# Patient Record
Sex: MALE | Race: WHITE
[De-identification: names, ages, dates, MRNs, and addresses within clinical notes are randomized per-mention and may not be internally consistent; named-entity substitution may affect disease eponyms.]

---

## 2017-04-18 ENCOUNTER — HOSPITAL ENCOUNTER (OUTPATIENT)
Dept: HOSPITAL 84 - D.LAB | Age: 77
Discharge: HOME | End: 2017-04-18
Attending: FAMILY MEDICINE
Payer: MEDICARE

## 2017-04-18 VITALS — BODY MASS INDEX: 30.8 KG/M2

## 2017-04-18 DIAGNOSIS — J44.9: ICD-10-CM

## 2017-04-18 DIAGNOSIS — I10: ICD-10-CM

## 2017-04-18 DIAGNOSIS — I25.10: ICD-10-CM

## 2017-04-18 DIAGNOSIS — N40.0: ICD-10-CM

## 2017-04-18 DIAGNOSIS — E78.5: ICD-10-CM

## 2017-04-18 DIAGNOSIS — Z00.00: Primary | ICD-10-CM

## 2017-04-18 LAB
ALBUMIN SERPL-MCNC: 3.2 G/DL (ref 3.4–5)
ALP SERPL-CCNC: 37 U/L (ref 46–116)
ALT SERPL-CCNC: 21 U/L (ref 10–68)
ANION GAP SERPL CALC-SCNC: 6.1 MMOL/L (ref 8–16)
BILIRUB SERPL-MCNC: 0.4 MG/DL (ref 0.2–1.3)
BUN SERPL-MCNC: 20 MG/DL (ref 7–18)
CALCIUM SERPL-MCNC: 9.4 MG/DL (ref 8.5–10.1)
CHLORIDE SERPL-SCNC: 101 MMOL/L (ref 98–107)
CHOLEST/HDLC SERPL: 11.6 RATIO (ref 2.3–4.9)
CO2 SERPL-SCNC: 36.5 MMOL/L (ref 21–32)
CREAT SERPL-MCNC: 1.2 MG/DL (ref 0.6–1.3)
ERYTHROCYTE [DISTWIDTH] IN BLOOD BY AUTOMATED COUNT: 12.6 % (ref 11.5–14.5)
GLOBULIN SER-MCNC: 4.3 G/L
GLUCOSE SERPL-MCNC: 117 MG/DL (ref 74–106)
HCT VFR BLD CALC: 43.5 % (ref 42–54)
HDLC SERPL-MCNC: 22 MG/DL (ref 32–96)
HGB BLD-MCNC: 14.3 G/DL (ref 13.5–17.5)
LDL-HDL RATIO: 9 RATIO (ref 1.5–3.5)
LDLC SERPL-MCNC: 198 MG/DL (ref 0–100)
LYMPHOCYTES NFR BLD AUTO: 26.9 % (ref 15–50)
MCH RBC QN AUTO: 32 PG (ref 26–34)
MCHC RBC AUTO-ENTMCNC: 32.9 G/DL (ref 31–37)
MCV RBC: 97.3 FL (ref 80–100)
NEUTROPHILS NFR BLD AUTO: 65.8 % (ref 40–80)
OSMOLALITY SERPL CALC.SUM OF ELEC: 281 MOSM/KG (ref 275–300)
PLATELET # BLD: 231 10X3/UL (ref 130–400)
PMV BLD AUTO: 9.5 FL (ref 7.4–10.4)
POTASSIUM SERPL-SCNC: 4.6 MMOL/L (ref 3.5–5.1)
PROT SERPL-MCNC: 7.5 G/DL (ref 6.4–8.2)
PSA SERPL-MCNC: 1.33 NG/ML (ref 0–4)
RBC # BLD AUTO: 4.47 10X6/UL (ref 4.2–6.1)
SODIUM SERPL-SCNC: 139 MMOL/L (ref 136–145)
TRIGL SERPL-MCNC: 183 MG/DL (ref 30–200)
WBC # BLD AUTO: 6.6 10X3/UL (ref 4.8–10.8)

## 2017-04-28 ENCOUNTER — HOSPITAL ENCOUNTER (INPATIENT)
Dept: HOSPITAL 84 - D.ER | Age: 77
LOS: 3 days | Discharge: HOME | DRG: 286 | End: 2017-05-01
Attending: FAMILY MEDICINE | Admitting: FAMILY MEDICINE
Payer: MEDICARE

## 2017-04-28 VITALS
WEIGHT: 180 LBS | BODY MASS INDEX: 29.99 KG/M2 | BODY MASS INDEX: 29.99 KG/M2 | HEIGHT: 65 IN | WEIGHT: 180 LBS | BODY MASS INDEX: 29.99 KG/M2 | HEIGHT: 65 IN

## 2017-04-28 DIAGNOSIS — N40.0: ICD-10-CM

## 2017-04-28 DIAGNOSIS — J96.22: ICD-10-CM

## 2017-04-28 DIAGNOSIS — K21.9: ICD-10-CM

## 2017-04-28 DIAGNOSIS — J20.9: ICD-10-CM

## 2017-04-28 DIAGNOSIS — G47.33: ICD-10-CM

## 2017-04-28 DIAGNOSIS — E78.5: ICD-10-CM

## 2017-04-28 DIAGNOSIS — J96.21: ICD-10-CM

## 2017-04-28 DIAGNOSIS — J44.1: ICD-10-CM

## 2017-04-28 DIAGNOSIS — I11.0: ICD-10-CM

## 2017-04-28 DIAGNOSIS — Z95.5: ICD-10-CM

## 2017-04-28 DIAGNOSIS — I50.20: ICD-10-CM

## 2017-04-28 DIAGNOSIS — I25.10: Primary | ICD-10-CM

## 2017-04-28 DIAGNOSIS — Z86.73: ICD-10-CM

## 2017-04-28 DIAGNOSIS — N28.1: ICD-10-CM

## 2017-04-28 DIAGNOSIS — J98.11: ICD-10-CM

## 2017-04-28 DIAGNOSIS — I08.1: ICD-10-CM

## 2017-04-28 DIAGNOSIS — J44.0: ICD-10-CM

## 2017-04-28 NOTE — HEMODYNAMI
PATIENT:ANGELICA MCGREGOR                                     MEDICAL RECORD: [id number]
: 42                                            LOCATION:[facility]     D.2136
[id number]                                       ADMISSION DATE: 17
 
 
 Generatedon:201714:02
Patient name: ANGELICA MCGREGOR Patient #: [id number] Visit #: [id number] SSN: : 3
/ Date
of study: 2017
Page: Of
Hemodynamic Procedure Report
****************************
Patient Data
Patient Demographics
Procedure consent was obtained
First Name: ANGELICA             Gender: Male
Last Name: MECHE           : 1940
Middle Initial: W           Age: 77 year(s)
Patient #: [id number]       Race: 
Visit #: [id number]
Accession #:
[id number]
Additional ID: [id number]
Contact details
Address: [address] [place] [address]   Phone: [phone]
State: [place]
City: [facility]
Zip code: [address]
Past Medical History
History of disease
Date         Diagnosis          Comments
CAD
Allergies
Allergen        Reaction        Date         Comments
Reported
Other allergy                   2016    Crestor
Other allergy                   2017     Crestor
Admission
Admission Data
Admission Date: 2017   Admission Time: 3:54
Room #: D.2136
Height (in.): 71            BSA: 0.76 (m2)
Height (cm.): 180.34        BMI: 2.51 (kg/m2)
Weight (lbs.): 18
Weight (kg.): 8.16
Lab Results
Lab Result Date: 2017   Lab Result Time: 0:00
Biochemistry
Name         Units    Result                Min      Max
BUN          mg/dl    22       --(----)-*   7        18
Creatinine   mg/dl    1.1      --(--*-)--   0.6      1.3
CBC
Name         Units    Result                Min      Max
Hemoglobin   g/dl     13.3     -*(----)--   13.5     17.5
Procedure
Procedure Types
Cath Procedure
Diagnostic Procedure
Former[facility] w/Coronaries
Miscellaneous Procedures
Moderate Sedation up to 15 minutes
Procedure Description
Procedure Date
Procedure Date: 2017
Procedure Start Time: 13:49
Procedure End Time: 13:59
Procedure Staff
Name                            Function
Brennen Mejia MD                   Performing Physician
Jason Álvarez RN                Nurse
Karly Monson RT             Scrub
Aristeo Waddell RT                  Monitor
Teodoro Marvin RT                  Monitor
Procedure Data
Cath Procedure
Fluoroscopy
Diagnostic fluoroscopy      Total fluoroscopy Time: 1.9
time: 1.9 min               min
Diagnostic fluoroscopy      Total fluoroscopy dose:
dose: 242.05 mGy            242.05 mGy
Contrast Material
Contrast Material Type                       Amount (ml)
Isovue 300                                   76
Entry Location
Entry     Primary  Successful  Side  Size  Upsize Upsize Entry    Closure Succes
sful  Closure
Location                             (Fr)  1 (Fr) 2 (Fr) Remarks  Device        
      Remarks
Femoral                        Right 5 Fr                         Exoseal
artery
Estimated blood loss: 5 ml
Diagnostic catheters
Device Type               Used For           End Catheter
Placement
Medtronic Dexterity 5Fr   Procedure
JL 4.0 catheter (NO
CHARGE)
Medtronic Dexterity 5Fr   Procedure
3DRC catheter (NO CHARGE)
Medtronic Dexterity 5Fr   Procedure
Pigtail catheter (NO
CHARGE)
Procedure Complications
No complications
Procedure Medications
Medication           Administration Route Dosage
Oxygen               NC                   2 l/min
Heparin Flush Bag    added to field       2 bags
(1000units/500ml NS)
0.9% NaCl            I.V.                 100 ml/hr
Fentanyl             I.V.                 50 mcg
Versed               I.V.                 1 mg
Fentanyl             I.V.                 50 mcg
Versed               I.V.                 1 mg
Fentanyl             I.V.                 50 mcg
Fentanyl             I.V.                 50 mcg
Hemodynamics
Rest
 
BSA: 0.76 (m2) HGB: 13.3 (g/dl) O2 Consumption: Estimated: 91.85 (ml/min) O2 Con
sumption
indexed: Estimated:120.86 (ml/min/m) Heart Rate: 86 (bpm)
Pressure Samples
Time     Site     Value (mmHg) Purpose      Heart      Use
Rate(bpm)
13:56    LV       144/7,16     Snapshot     83
13:57    AO       150/56(91)   Pullback     81
13:57    LV       145/8,16     Pullback     81
Gradients
Valve  Time  Site 1   Site 2     Mean    SEP/DFP    Peak To Heart  Use
(mmHg)  (sec/min)  Peak    Rate
(mmHg)  (bpm)
Aortic 13:57 LV       AO         0       9          0       81
145/8,16 150/56(91)
Calculations
Valve    P-P      Mean      Valve     Index  Valve    Source
Name              Gradient  Area             Flow
(cm2)
Aortic   0        0
0        0
Snapshots
Pre Cath      Intra         NCS           Post Cath
Vital Signs
Time     Heart  Resp   SPO2 NIBP (mmHg) Rhythm  Pain    Sedation
Rate   (ipm)  (%)                      Status  Level
(bpm)
13:32:19 100    18     96   174/85(136) NSR     0 (11)  10(A)
, No
pain
13:36:37 81     17     96   148/76(111) NSR     0 (11)  10(A)
, No
pain
13:40:57 81     18     95   138/68(107) NSR     0 (11)  10(A)
, No
pain
13:45:20 80     17     95   140/68(100) NSR     0 (11)  10(A)
, No
pain
13:49:40 78     17     91   136/71(107) NSR     0 (11)  9(A)
, No
pain
13:53:58 75     17     91   139/72(105) NSR     0 (11)  9(A)
, No
pain
13:58:18 82     18     92   136/71(106) NSR     0 (11)  9(A)
, No
pain
14:01:37 83     16     92   134/73(98)  NSR     0 (11)  9(A)
, No
pain
Medications
Time     Medication       Route  Dose  Verified Delivered Reason    Notes  Effec
tiveness
by       by
13:28:45 Oxygen           NC     2     Jason   Jason    Per
l/min Henri Álvarez RN physician
RN
13:28:56 Heparin Flush    added  2     Jason   Jason    used for
Bag              to     bags  Henri Álvarez RN procedure
 
(1000units/500ml field        RN
NS)
13:29:10 0.9% NaCl        I.V.   100   Jason   Jason    Per
ml/hr Herni Álvarez RN physician
RN
13:48:14 Fentanyl         I.V.   50    Jason   Jason    for
narciso Álvarez RN sedation
RN
13:48:21 Versed           I.V.   1 mg  Jason   Jason    for
Henri Álvarez RN sedation
RN
13:50:21 Fentanyl         I.V.   50    Jason   Jason    for
narciso Álvarez RN sedation
RN
13:50:27 Versed           I.V.   1 mg  Jason   Jason    for
Henri Álvarez RN sedation
RN
13:52:47 Fentanyl         I.V.   50    Jason   Jason    for
narciso Álvarez RN sedation
RN
13:56:38 Fentanyl         I.V.   50    Jason   Jason    for
narciso Álvarez RN sedation
RN
Procedure Log
Time     Note
13:06:20 Jason Álvarez RN sent for patient. Start room use.
13:15:21 Time tracking: Regular hours
13:15:25 Plan of Care:Hemodynamics will remain stable., Cardiac
rhythm will remain stable., Comfort level will be
maintained., Respiratory function will remain
adequate., Patient/ family verbilizes understanding of
procedure., Procedure tolerated without complication.,
Recovers from procedure without complications..
13:24:06 Patient received from PCU to CCL 3 Alert and oriented.
Tansferred to table in Supine position.
13:24:07 Warm blankets applied, and abram hugger turned on for
patient comfort.
13:24:07 Correct patient and procedure confirmed by team.
13:24:09 Signed procedure consent form obtained from patient.
13:24:10 ECG and BP/O2 sat monitors applied to patient.
13:24:10 Full Disclosure recording started
13:28:45 Oxygen 2 l/min NC was administered by Jason Álvarez
RN; Per physician;
13:28:56 Heparin Flush Bag (1000units/500ml NS) 2 bags added to
field was administered by Jason Álvarez RN; used for
procedure;
13:29:10 0.9% NaCl 100 ml/hr I.V. was administered by Jason Álvarez RN; Per physician;
13:30:56 Vital chart was started
13:33:26 Baseline sample Acquired.
13:33:31 Rhythm: sinus rhythm
13:34:10 Diagnostic Cath Status : Elective
13:36:02 H&P Date Dictated: 2017 Within 30 days and on
chart..
13:36:04 Pre-procedure instructions explained to patient.
13:36:05 Pre-op teaching completed and patient verbalized
understanding.
13:36:22 Family in waiting room.
13:36:24 Patient NPO since Breakfast.
13:36:46 Patient allergic to Other allergy Crestor
 
13:36:49 Is the patient allergic to Iodine/contrast media? No.
13:36:55 Is patient on blood thinner?No
13:36:59 Patient diabetic? No.
13:37:01 ----Pre-sedation anethsthesia assessment.----
13:37:03 Previous problem with sedation/anesthesia? No ?
13:37:05 Snore? Yes
13:37:06 Sleep apnea? Yes
13:37:08 Deviated septum? No
13:37:09 Opens mouth fully? Yes
13:37:10 Sticks out tongue? Yes
13:37:16 Airway obstruction? Yes COPD
13:37:19 Dentures? No ?
13:37:24 Pre procedure: right dorsailis pedis pulse 2+ Normal;
easily identifiable; not easily obliterated
13:37:29 Patient pain scale 0/10 ?.
13:37:36 IV patent on arrival in left forearm with 0.9% NaCl at
[facility].
13:38:13 Lab Result : BUN 22 mg/dl
13:38:13 Lab Result : Creatinine 1.1 mg/dl
13:38:13 Lab Result : Hemoglobin 13.3 g/dl
13:38:17 Lab results completed and on chart.
13:38:23 Right groin area was prepped with chlora-prep and
draped in sterile fashion
13:38:54 NO PREP OF RADIAL, LAST CASE WAS A 7 [place] SYSTEM
13:38:57 Alarms reviewed by R. N.
13:38:58 Sharps counted by scrub and verified by R.N.
13:39:19 Patient Height : 180.34 cm
13:39:40 Patient Weight : 8.16 kg
13:39:52 Use device set Femoral Dx
13:39:54 Acist Syringe opened to sterile field.
13:39:54 Bag Decanter opened to sterile field.
13:39:55 Medline Cath Pack opened to sterile field.
13:39:56 Terumo 5Fr [place] Sheath opened to sterile field.
13:39:56 [place]is 260cm J .035 wire opened to sterile field.
13:39:58 Acist Hand Control opened to sterile field.
13:39:59 Acist Manifold opened to sterile field.
13:40:02 Tegaderm 4 x 4 opened to sterile field.
13:46:31 Zero performed for pressure channel P1
13:46:38 Physician arrived
13:46:38 --------ALL STOP TIME OUT------
13:46:39 Final Timeout: patient, procedure, and site verified
with staff and physician. All members of the team are
in agreement.
13:46:41 Right groin site verified by team.
13:46:45 Physical assessment completed. ASA score P 2 - A
patient with mild systemic disease as per Brennen Mejia MD.
13:46:50 Sedation plan: IV Moderate Sedation Versed, Fentanyl
13:47:51 Zero performed for pressure channel P1
13:48:14 Fentanyl 50 mcg I.V. was administered by Jason Álvarez
RN; for sedation;
13:48:21 Versed 1 mg I.V. was administered by Jason Álvarez RN;
for sedation;
13:49:32 Procedure started.
13:49:35 Local anesthetic to right femoral artery with
Lidocaine 2% by Brennen Mejia MD.**INITIAL ACCESS ONLY**
13:49:46 A 5 Fr sheath was inserted into the Right Femoral
artery
13:50:21 Fentanyl 50 mcg I.V. was administered by Jason Álvarez
RN; for sedation;
 
13:50:27 Versed 1 mg I.V. was administered by Jason Álvarez RN;
for sedation;
13:51:22 A Medtronic Dexterity 5Fr JL 4.0 catheter (NO CHARGE)
was advanced over the wire and used for Procedure.
13:52:47 Fentanyl 50 mcg I.V. was administered by Jason Álvarez
RN; for sedation;
13:53:03 LCA angiography performed.
13:53:06 Catheter exchanged over wire.
13:53:11 A Medtronic Dexterity 5Fr 3DRC catheter (NO CHARGE)
was advanced over the wire and used for Procedure.
13:54:38 RCA angiography performed.
13:55:05 Catheter exchanged over wire.
13:55:11 A Medtronic Dexterity 5Fr Pigtail catheter (NO CHARGE)
was advanced over the wire and used for Procedure.
13:55:16 Cordis 5Fr Exoseal opened to sterile field.
13:56:26 LV gram done using SORTO
13:56:31 Injector settings: Ml/sec: 10, Volume: 20,
13:56:38 Fentanyl 50 mcg I.V. was administered by Jason Álvarez
RN; for sedation;
13:56:54 EF : 60 %
13:57:16 LV hemodynamics recorded.
13:57:22 Catheter removed.
13:57:36 Sheath removed intact; hemostasis achieved with
Exoseal to the Right Femoral artery.
13:57:39 Procedure ended.(Physican Out)
13:57:50 Fluoroscopy time 01.90 minutes.
13:58:00 Fluoroscopy dose: 242.05 mGy
13:58:00 Flurop Dose total: 242.05
13:58:04 Contrast amount:Isovue 300 76ml.
13:58:05 Sharps counted by scrub and verified by R.N.
13:58:07 Insertion/operative site no bleeding no hematoma.
13:58:09 Post-op/insertion site Right Femoral artery dressed
using a 4 x 4 and Tegaderm.
13:58:13 Post right femoral artery:stable, soft, clean and dry
13:58:14 Post Procedure Pulses reassessed and unchanged
13:58:16 Post-procedure physical assessment completed. ASA
score P 2 - A patient with mild systemic disease as
per Brennen Mejia MD.
13:58:18 Post procedure rhythm: unchanged.
13:58:21 Estimated blood loss: 5 ml
13:58:22 Post procedure instruction explained to
patient.Patient verbalizes understanding.
13:58:22 Patient needs reinforcement of post procedure
teaching.
13:58:30 Procedure type changed to Cath procedure, Diagnostic
procedure, LHC, LHC w/Coronaries, Miscellaneous
Procedures, Moderate Sedation up to 15 minutes
13:58:52 Procedure and supply charges have been captured,
reviewed, submitted and are correct.
13:58:54 Procedure Complication : No complications
13:59:01 Vital chart was stopped
13:59:01 See physician's report for complete and final results.
13:59:02 Report given to Pre/Post Procedure Room.
13:59:05 Patient transfered to Pre/Post Procedure Room with
Stretcher.
13:59:06 Procedure ended.
13:59:06 Full Disclosure recording stopped
13:59:13 End room use (Document Last)
Device Usage
Item Name Manufacture  Quantity  Catalog     Hospital Part    Current Minimal Lo
 
t# /
Number      Charge   Number  Stock   Stock   Serial#
Code
Acist     Acist        1         [id number]       716160   940024  685368  20
Syringe   Medical
Systems Inc
Bag       Microtek     1         S       465497   78042   063703  5
[employer]
Medline   Cardinal     1         [id number]   177933   69148   183603  5
Erydel    TerQUALIA (formerly known as LocalResponse)       1         [id number]      254228   250016  411222  40
5Fr
[place]
Sheath
St Alexis   St Alexis      1         398352      333514   765514  296761  30
260cm J
.035 wire
Acist     Acist        1         62295       759224   643931  708013  5
Hand      [employer]
Control   Systems Inc
Acist     Acist        1         51673       973044   920770  176293  5
Manifold  Medical
Systems Inc
Tegaderm             1         1626W       540238   502999  786475  5
4 x 4
Medtronic Medtronic    1         [id number]    300585           944577  5
Dexterity
5Fr JL
4.0
catheter
(NO
CHARGE)
Medtronic Medtronic    1         [id number]    452474           624234  5
Dexterity
5Fr 3DRC
catheter
(NO
CHARGE)
Medtronic Medtronic    1         [id number]  135278           124991  5
Dexterity
5Fr
Pigtail
catheter
(NO
CHARGE)
Cordis    Cardinal     1                951810   593922  498315  10
5Fr       Health
Exoseal
Signature Audit [place]
Stage           Time        Signature      Unsigned
Intra-Procedure 2017    Teodoro Marvin
2:02:20 PM  RT(R)
Signatures
Monitor : Aristeo Waddell RT Signature :
______________________________
Date : ______________ Time :
______________
Monitor : Teodoro Marvin RT Signature :
______________________________
 
Date : ______________ Time :
______________
 
 
 
 
 
 
 
 
 
 
 
 
 
 
 
 
 
 
 
 
 
 
 
 
 
 
 
 
 
 
 
 
 
 
 
 
 
 
 
 
 
 
 
 
 
 
 
 
 
 
 
 
 
[place] [address]                           
[place] [address]

## 2017-04-29 VITALS — DIASTOLIC BLOOD PRESSURE: 75 MMHG | SYSTOLIC BLOOD PRESSURE: 147 MMHG

## 2017-04-29 VITALS — SYSTOLIC BLOOD PRESSURE: 113 MMHG | DIASTOLIC BLOOD PRESSURE: 56 MMHG

## 2017-04-29 VITALS — DIASTOLIC BLOOD PRESSURE: 84 MMHG | SYSTOLIC BLOOD PRESSURE: 103 MMHG

## 2017-04-29 VITALS — SYSTOLIC BLOOD PRESSURE: 157 MMHG | DIASTOLIC BLOOD PRESSURE: 98 MMHG

## 2017-04-29 LAB
ALBUMIN SERPL-MCNC: 3.4 G/DL (ref 3.4–5)
ALP SERPL-CCNC: 37 U/L (ref 46–116)
ALT SERPL-CCNC: 30 U/L (ref 10–68)
ANION GAP SERPL CALC-SCNC: 9.5 MMOL/L (ref 8–16)
BASOPHILS NFR BLD AUTO: 0.4 % (ref 0–2)
BILIRUB SERPL-MCNC: 0.4 MG/DL (ref 0.2–1.3)
BUN SERPL-MCNC: 23 MG/DL (ref 7–18)
CALCIUM SERPL-MCNC: 8.7 MG/DL (ref 8.5–10.1)
CHLORIDE SERPL-SCNC: 99 MMOL/L (ref 98–107)
CK MB SERPL-MCNC: 2.2 U/L (ref 0–3.6)
CK SERPL-CCNC: 75 UL (ref 21–232)
CK SERPL-CCNC: 75 UL (ref 21–232)
CO2 SERPL-SCNC: 35.3 MMOL/L (ref 21–32)
CREAT SERPL-MCNC: 1.3 MG/DL (ref 0.6–1.3)
EOSINOPHIL NFR BLD: 5.2 % (ref 0–7)
ERYTHROCYTE [DISTWIDTH] IN BLOOD BY AUTOMATED COUNT: 12.7 % (ref 11.5–14.5)
GLOBULIN SER-MCNC: 3.5 G/L
GLUCOSE SERPL-MCNC: 184 MG/DL (ref 74–106)
HCT VFR BLD CALC: 54.5 % (ref 42–54)
HGB BLD-MCNC: 17.9 G/DL (ref 13.5–17.5)
IMM GRANULOCYTES NFR BLD: 0.2 % (ref 0–5)
LYMPHOCYTES NFR BLD AUTO: 22.6 % (ref 15–50)
MCH RBC QN AUTO: 32.3 PG (ref 26–34)
MCHC RBC AUTO-ENTMCNC: 32.8 G/DL (ref 31–37)
MCV RBC: 98.2 FL (ref 80–100)
MONOCYTES NFR BLD: 4.9 % (ref 2–11)
NEUTROPHILS NFR BLD AUTO: 66.7 % (ref 40–80)
NT-PROBNP SERPL-MCNC: 531 PG/ML (ref 0–450)
OSMOLALITY SERPL CALC.SUM OF ELEC: 287 MOSM/KG (ref 275–300)
PLATELET # BLD: 152 10X3/UL (ref 130–400)
PMV BLD AUTO: 11.2 FL (ref 7.4–10.4)
POTASSIUM SERPL-SCNC: 3.8 MMOL/L (ref 3.5–5.1)
PROT SERPL-MCNC: 6.9 G/DL (ref 6.4–8.2)
RBC # BLD AUTO: 5.55 10X6/UL (ref 4.2–6.1)
SODIUM SERPL-SCNC: 140 MMOL/L (ref 136–145)
TROPONIN I SERPL-MCNC: 0.04 NG/ML (ref 0–0.06)
TROPONIN I SERPL-MCNC: 0.14 NG/ML (ref 0–0.06)
WBC # BLD AUTO: 5.4 10X3/UL (ref 4.8–10.8)

## 2017-04-29 NOTE — NUR
INFORMED MIKAL IN TELEMETRY THAT PT HAS ORDERS TO BE PUT ON HEART MONITOR.
MIKAL STATED THAT THEY ARE ONLY PUTTING PTS ON THE MONITOR IF THEY HAVE CHEST
PAIN, ELEVATED TROPONIN, CHF, AND ANGINA. WILL CONTINUE TO MONITOR.

## 2017-04-29 NOTE — NUR
ADMISSION ASSESSMENT DONE. PT IS LAYING IN BED ON BACK WITH EYES CLOSED
RESTING. BI-PAP IS ON AND WIFE IS AT BEDSIDE. NO NEED AT CURRENT TIME. WILL
CONTINUE TO MONITOR.

## 2017-04-29 NOTE — NUR
REC'D PT FROM ER VIA BED. PT A/O X3. WEARING BI-PAP AT 40%. SPOUSE AT BEDSIDE.
18G IV TO RT AC, IS SL. ABRASION TO LEFT FOREHEAD D/T FALL. PT AND SPOUSE
ORIENTED TO ROOM. CALL LIGHT WITH IN REACH. PT ASKING ABOUT FOOD. INFORMED PT
AND SPOUSE THAT WE ARE AWAITING ORDERS FROM DR. LARA. PT APPEARS
COMFORTABLE. NO DISTRESS NOTED. WHEN ASKED IF PT WAS FEELING BETTER SHE SHOOK
HIS HEAD YES.

## 2017-04-29 NOTE — NUR
0656- PT TO FLOOR VIA STRETCHER ACCOMPANIED BY ER STAFF. RECEIVED REPORT FROM
LEOBARDO (NIGHT SHIFT NURSE). PT IS ALERT AND ORIENTED. ON BI-PAP. IV SEEN TO
RIGHT AC THAT IS CURRENTLY SALINE LOCKED. WIFE IS AT BEDSIDE. LEOBARDO (NIGHT
SHIFT NURSE) IS DOING QUICKSTART AND ADMISSION HISTORY, WILL DO ADMISSION
ASSESSMENT AND CONTINUE TO MONITOR.

## 2017-04-29 NOTE — NUR
HS MEDS GIVEN WITH FRESH ICE WATER, INFORMED H.S. THAT I HAD SENT OUT A PAGE
FOR DR MCGOVERN BUT DIDNT RECIEVE A RETURN CALL.

## 2017-04-29 NOTE — NUR
UPON WALKING INTO PTS ROOM, NOTICED PT IS OFF HIS BI-PAP AND HIS NC IS NOT ON.
PT IS SITTING UP IN SIDE OF THE BED EATING LUNCH. I CHECKED PTS 02 SAT WHICH
WAS 74%. I ASKED PT IF HE FELT OKAY AND HE REPLIED " YES". I TURNED PTS O2 VIA
NC UP TO 4L AND RECHECKED PTS O2 SAT WHICH IS 90% NOW. INSTRUCTED PT LEAVE 02
ON. WILL CONTINUE TO MONITOR.

## 2017-04-30 VITALS — SYSTOLIC BLOOD PRESSURE: 161 MMHG | DIASTOLIC BLOOD PRESSURE: 65 MMHG

## 2017-04-30 VITALS — SYSTOLIC BLOOD PRESSURE: 164 MMHG | DIASTOLIC BLOOD PRESSURE: 69 MMHG

## 2017-04-30 VITALS — DIASTOLIC BLOOD PRESSURE: 67 MMHG | SYSTOLIC BLOOD PRESSURE: 124 MMHG

## 2017-04-30 VITALS — SYSTOLIC BLOOD PRESSURE: 133 MMHG | DIASTOLIC BLOOD PRESSURE: 57 MMHG

## 2017-04-30 VITALS — DIASTOLIC BLOOD PRESSURE: 65 MMHG | SYSTOLIC BLOOD PRESSURE: 153 MMHG

## 2017-04-30 VITALS — SYSTOLIC BLOOD PRESSURE: 159 MMHG | DIASTOLIC BLOOD PRESSURE: 78 MMHG

## 2017-04-30 VITALS — SYSTOLIC BLOOD PRESSURE: 139 MMHG | DIASTOLIC BLOOD PRESSURE: 64 MMHG

## 2017-04-30 LAB
ANION GAP SERPL CALC-SCNC: 9.3 MMOL/L (ref 8–16)
BASOPHILS NFR BLD AUTO: 0 % (ref 0–2)
BUN SERPL-MCNC: 22 MG/DL (ref 7–18)
CALCIUM SERPL-MCNC: 9.1 MG/DL (ref 8.5–10.1)
CHLORIDE SERPL-SCNC: 99 MMOL/L (ref 98–107)
CO2 SERPL-SCNC: 34 MMOL/L (ref 21–32)
CREAT SERPL-MCNC: 1.1 MG/DL (ref 0.6–1.3)
EOSINOPHIL NFR BLD: 0 % (ref 0–7)
ERYTHROCYTE [DISTWIDTH] IN BLOOD BY AUTOMATED COUNT: 12.5 % (ref 11.5–14.5)
GLUCOSE SERPL-MCNC: 183 MG/DL (ref 74–106)
HCT VFR BLD CALC: 40.6 % (ref 42–54)
HGB BLD-MCNC: 13.3 G/DL (ref 13.5–17.5)
IMM GRANULOCYTES NFR BLD: 0.2 % (ref 0–5)
LYMPHOCYTES NFR BLD AUTO: 7.2 % (ref 15–50)
MAGNESIUM SERPL-MCNC: 1.7 MG/DL (ref 1.8–2.4)
MCH RBC QN AUTO: 32 PG (ref 26–34)
MCHC RBC AUTO-ENTMCNC: 32.8 G/DL (ref 31–37)
MCV RBC: 97.8 FL (ref 80–100)
MONOCYTES NFR BLD: 1.6 % (ref 2–11)
NEUTROPHILS NFR BLD AUTO: 91 % (ref 40–80)
OSMOLALITY SERPL CALC.SUM OF ELEC: 283 MOSM/KG (ref 275–300)
PHOSPHATE SERPL-MCNC: 3 MG/DL (ref 2.5–4.9)
PLATELET # BLD: 205 10X3/UL (ref 130–400)
PMV BLD AUTO: 10.6 FL (ref 7.4–10.4)
POTASSIUM SERPL-SCNC: 4.3 MMOL/L (ref 3.5–5.1)
RBC # BLD AUTO: 4.15 10X6/UL (ref 4.2–6.1)
SODIUM SERPL-SCNC: 138 MMOL/L (ref 136–145)
TROPONIN I SERPL-MCNC: 0.16 NG/ML (ref 0–0.06)
WBC # BLD AUTO: 16.6 10X3/UL (ref 4.8–10.8)

## 2017-04-30 PROCEDURE — 5A09457 ASSISTANCE WITH RESPIRATORY VENTILATION, 24-96 CONSECUTIVE HOURS, CONTINUOUS POSITIVE AIRWAY PRESSURE: ICD-10-PCS | Performed by: FAMILY MEDICINE

## 2017-04-30 NOTE — NUR
RECEIVED REPORT FROM NIGHT SHIFT NURSE, NICHOLAS ARAIZA. PT IN BED USING BIPAP AT THIS
TIME. PT DENIE ANY NEEDS AT THIS TIME. CALL LIGHT IN REACH, NAD NOTED, WILL
CONTINUE TO MONITOR.

## 2017-04-30 NOTE — NUR
ADMINISTERED MORNING MEDICATIONS. PT IN BED, FAMILY AT BEDSIDE. PT DENIES ANY
NEEDS AT THIS TIME. CALL LIGHT IN REACH, NAD NOTED, WILL CONTINUE TO MONITOR.

## 2017-04-30 NOTE — NUR
INITIAL ROUNDS MADE. PT SITTING UP IN BED WITH FAMILY IN ROOM. PT ON BIPAP AT
THIS TIME. NO NEEDS OR C/O VOICED AT THIS TIME. WILL CONT TO MONITOR.

## 2017-05-01 VITALS — DIASTOLIC BLOOD PRESSURE: 75 MMHG | SYSTOLIC BLOOD PRESSURE: 151 MMHG

## 2017-05-01 VITALS — DIASTOLIC BLOOD PRESSURE: 73 MMHG | SYSTOLIC BLOOD PRESSURE: 163 MMHG

## 2017-05-01 VITALS — SYSTOLIC BLOOD PRESSURE: 164 MMHG | DIASTOLIC BLOOD PRESSURE: 64 MMHG

## 2017-05-01 VITALS — DIASTOLIC BLOOD PRESSURE: 64 MMHG | SYSTOLIC BLOOD PRESSURE: 140 MMHG

## 2017-05-01 LAB
ANION GAP SERPL CALC-SCNC: 6.5 MMOL/L (ref 8–16)
BASOPHILS NFR BLD AUTO: 0 % (ref 0–2)
BUN SERPL-MCNC: 31 MG/DL (ref 7–18)
CALCIUM SERPL-MCNC: 8.7 MG/DL (ref 8.5–10.1)
CHLORIDE SERPL-SCNC: 100 MMOL/L (ref 98–107)
CO2 SERPL-SCNC: 37.5 MMOL/L (ref 21–32)
CREAT SERPL-MCNC: 1.2 MG/DL (ref 0.6–1.3)
EOSINOPHIL NFR BLD: 0 % (ref 0–7)
ERYTHROCYTE [DISTWIDTH] IN BLOOD BY AUTOMATED COUNT: 12.8 % (ref 11.5–14.5)
GLUCOSE SERPL-MCNC: 223 MG/DL (ref 74–106)
HCT VFR BLD CALC: 41 % (ref 42–54)
HGB BLD-MCNC: 13.4 G/DL (ref 13.5–17.5)
IMM GRANULOCYTES NFR BLD: 0.3 % (ref 0–5)
LYMPHOCYTES NFR BLD AUTO: 5.7 % (ref 15–50)
MCH RBC QN AUTO: 32.1 PG (ref 26–34)
MCHC RBC AUTO-ENTMCNC: 32.7 G/DL (ref 31–37)
MCV RBC: 98.1 FL (ref 80–100)
MONOCYTES NFR BLD: 3.8 % (ref 2–11)
NEUTROPHILS NFR BLD AUTO: 90.2 % (ref 40–80)
OSMOLALITY SERPL CALC.SUM OF ELEC: 292 MOSM/KG (ref 275–300)
PLATELET # BLD: 200 10X3/UL (ref 130–400)
PMV BLD AUTO: 10.7 FL (ref 7.4–10.4)
POTASSIUM SERPL-SCNC: 4 MMOL/L (ref 3.5–5.1)
RBC # BLD AUTO: 4.18 10X6/UL (ref 4.2–6.1)
SODIUM SERPL-SCNC: 140 MMOL/L (ref 136–145)
WBC # BLD AUTO: 14.6 10X3/UL (ref 4.8–10.8)

## 2017-05-01 PROCEDURE — 4A023N7 MEASUREMENT OF CARDIAC SAMPLING AND PRESSURE, LEFT HEART, PERCUTANEOUS APPROACH: ICD-10-PCS | Performed by: INTERNAL MEDICINE

## 2017-05-01 PROCEDURE — B2151ZZ FLUOROSCOPY OF LEFT HEART USING LOW OSMOLAR CONTRAST: ICD-10-PCS | Performed by: INTERNAL MEDICINE

## 2017-05-01 PROCEDURE — B2111ZZ FLUOROSCOPY OF MULTIPLE CORONARY ARTERIES USING LOW OSMOLAR CONTRAST: ICD-10-PCS | Performed by: INTERNAL MEDICINE

## 2017-05-01 NOTE — NUR
Patient Name: ANGELICA MCGREGOR Admission Status: ER
Accout number: [id number] Admission Date: 2017
: 1940 Admission Diagnosis:
Attending: JOANNE Current LOS: 2
 
Anticipated DC Date: 2017
Planned Disposition: Home
Primary Insurance: MEDICARE A & B
 
 
Discharge Planning Comments:
* Is the patient Alert and Oriented? Yes 0
* How many steps to enter\exit or inside your home? NONE 0
* PCP DR. ASH 0
* Pharmacy [facility]
OR [facility] 0
* Preadmission Environment Home with Family 0
* ADLs Independent 0
* Equipment CPAP
Nebulizer
Oxygen 0
* Other Equipment HOME AND PORTABLE OXYGEN
[facility] - MEDICAL EQUIPMENT PROVIDER 0
* List name and contact numbers for known caregivers / representatives who
currently or will assist patient after discharge: DEMETRIUS MCGREGOR, SPOUSE,
[phone] 0
* Community resources currently utilized None 0
* Please name any agencies selected above. NONE 0
* Additional services required to return to the preadmission environment? No 0
* Can the patient safely return to the preadmission environment? Yes 0
* Has this patient been hospitalized within the prior 30 days at any hospital?
No 0
 
CM RECEIVED ORDER FOR HOSPITAL BED AND NIPPV ARRANGEMENT. CM MET WITH PT AND
SPOUSE IN ROOM TO DISCUSS DISCHARGE PLANNING AND NEEDS. PT REPORTS LIVING AT
HOME INDEPENDENTLY WITH SPOUSE. PT HAS CPAP, NEBULILZER, HOME AND PORTABLE
OXYGEN FROM [facility] [place]. PT HAS NO OUTSIDE SERVICES ASSISTING IN THE HOME. CM
DISCUSSED AVAILABILITY OF HOME HEALTH, REHAB SERVICES AND MEDICAL EQUIPMENT.
PT DENIES DISCHARGE NEEDS OTHER THAN THE ORDERS THE DOCTORS HAVE PUT IN, PT
THINKS HE MAY GO HOME TOMORROW IF ARRANGEMENTS ARE COMPLETED. PT WOULD LIKE TO
USE [facility] [place] FOR MEDICAL EQUIPMENT. PT REPORTS HIS SPOUSE WILL PICK HIM UP FOR
DISCHARGE HOME. IMPORTANT MESSAGE FROM MEDICARE PROVIDED AND EXPLAINED.
 
CM CALLED [facility] [place], [phone], SPOKE TO RAY, NOTIFIED OF ORDER, FAXED ORDER
TO [facility] [place] [phone].
 
CM WAITING ARRANGEMENT OF HOSPITAL BED FOR HOME USE AS WELL AS NIPPV BY
[facility] [place]. CM TO CONTINUE TO FOLLOW AND ASSIST AS NEEDED.
 
: Kirk Payne

## 2017-05-01 NOTE — NUR
PT BIPAP CAN NOT BE DELIVERED FROM [facility] UNTIL TOMORROW. PT SAYS OK WITH HIM
TO JUST USE HOME CPAP MACHINE. PAGED DR CHILDRESS TO MAKE SURE

## 2017-05-01 NOTE — NUR
PT BACK FROM CATH LAB AWAKE ALERT AND ORIENTED. VS ARE WNL. R GROIN INCISION
SITE IS WNL. FAMILY AT BEDSIDE. FLAT FOR 2 HOURS

## 2017-05-01 NOTE — NUR
DR CHILDRESS IS OK WITH PT USING CPAP TOMORROW AND USING BIPAP AFTER DELIVERED. PT
WANTS TO GO HOME, DR CHILDRESS SAID "WHATEVER HE WANTS". WORKING ON DC PAPERWORK
NOW. DR CHILDRESS WANTS PT TO GO HOME ON LEVAQUIN 750 MG DAILY FOR 10 DAYS. CALLED
INTO [facility]QU[facility] PIKE SPOKE WITH ROSA

## 2017-05-01 NOTE — NUR
PT R GROIN SITE STILL WNL, PT VS ARE STILL WNL FAMILY AT BEDSIDE. PT STILL
LAYING FLAT AND IS EATING A SANDWICH.

## 2017-05-01 NOTE — NUR
PT SAYING THAT HE IS TO GO FOR HEART CATH TODAY. NO ORDERS. DR ARREAGA'S NOTE
FROM YESTERDAY SAYS HE IS TO HAVE HEART CATH TODAY. CALLED CARDIAC CATH LAB
SPOKE WITH JOSHUA, HE SAYS PT IS ON SCHEDULE FOR LATER THIS AFTERNOON.
OBTAINED CONSENTS FOR CARDIAC CATH AND BLOOD AND PLACED ON PT CHART.

## 2017-05-02 NOTE — NUR
Patient Name: ANGELICA MCGREGOR
Encounter No: [id number]
: 1940
Primary Insurance: MEDICARE A & B
Anticipated DC Date: 2017
Planned Disposition: Home
 
 
DCP follow-up note: YANDEL CALLED [facility], [phone], NOTIFIED LO[facility] OF PT'S
DISCHARGE AND REQUESTED THAT ALL MEDICAL EQUIPMENT DELIVERY (NIPPV AND
HOSPITAL BED) BE ARRANGED WITH PT AT HOME. LOYD REPORTS ORDERS ARE BEING
PROCESSED AND [facility] WILL FOLLOW UP WITH PT AT HOME.
 
Kirk Payne, CASE MANAGEMENT

## 2017-05-04 NOTE — EC
PATIENT:ANGELICA MCGREGOR                    DATE OF SERVICE: 04/29/17
SEX: M                                  MEDICAL RECORD: [id number]
DATE OF BIRTH: [birth year]                        LOCATION:D.M2      D.213
AGE OF PATIENT: 77                             ADMISSION DATE: 04/29/17
 
REFERRING PHYSICIAN:                               
 
INTERPRETING PHYSICIAN: FAHAD MEJIA M.D.          
 
 
 
                             ECHOCARDIOGRAM REPORT
  ECHO CHARGES 4               ECHO COMPLETE            
 
 
 
CLINICAL DIAGNOSIS: SOB      HX CAD/MULTIPLE      
                    STENTS                        
                         ECHOCARDIOGRAPHIC MEASUREMENTS
      (adult normal given)
        AC root (d.<3.7cm) 3.3    LV Septum d (<1.2 cm> 1.7 
           Valve Excursion 1.8      LV Septum (systole) 1.8 
     Left Atria (s.<4.0cm> 4.7           LVPW d(<1.2cm) 1.8 
             RV (d.<2.3cm) 4.9            LVPW (sytole) 2.0 
       LV diastole(<5.6CM) 5.6        MV E-F(>70mm/sec)     
                LV systole 3.6            LVOT Diameter 1.9 
            MV exc.(>10mm) 1.3 
Est.ejection fraction (50-75%)     Pericardial Effusion N
 
   DOPPLER:
     LVIT       A 117  E 121 
       LA      RVSP 41  
     LVOT 104  AOP1/2T     
  Asc. Ao 158 
     RVOT     
       RA     
       PA     
 AV Gradient Peak 9.95  AV Mean 4.6   AV Area 2.1 
 MV Gradient Peak 9.77  MV Mean 4.09  MV Area     
   COMMENTS:                                              
 
 
 Cardiac Sonographer: Darien WAGN              
      Cardiologist:Darien Mejia                
             TAPE# PACS           
                                                                                     
 
 
DATE OF SERVICE:  04/30/2017
 
REFERRING PHYSICIAN:  Porter Ann MD.
 
INDICATION:  Dyspnea.
 
DESCRIPTION:  Left ventricle demonstrates left ventricular hypertrophy.  No wall
motion abnormalities are noted.  Estimation ejection fraction is 60%.  Mitral
valve is structurally normal.  There is mild regurgitation seen.  Left atrium is
moderately dilated.  The aortic valve is trileaflet.  There is no stenosis or
 
 
 
ECHOCARDIOGRAM REPORT                          [id number]    ANGELICA MCGREGOR            
 
 
regurgitation seen.  Right ventricle is mildly dilated.  Tricuspid valve is
normal.  There is mild regurgitation noted.  Right ventricular systolic pressure
is elevated at 41 mmHg.  There is no pericardial effusion seen.
 
IMPRESSION:
1.  Left ventricular hypertrophy with preserved ejection fraction of 60%.
2.  Mild mitral regurgitation.
3.  Mild tricuspid regurgitation with elevated pulmonary pressures.
 
TRANSINT:[id number] Voice Confirmation ID: [id number] DOCUMENT ID: [id number]
                                           
                                           FAHAD MEJIA M.D.          
 
 
 
Electronically Signed by FAHAD MEJIA on 05/04/17 at 1059
 
 
 
 
 
 
 
 
 
 
 
 
 
 
 
 
 
 
 
 
 
 
 
 
 
 
 
 
 
 
CC:                                                             [id number]
DICTATION DATE: 05/01/17 0646     :     05/01/17 0940      DIS IN  
                                                                      05/01/17
[place] [id number] [place] [id number]

## 2017-07-18 NOTE — NUR
After Visit Summary   7/18/2017    Laci Hinkle Jr    MRN: [id number]           Patient Information     Date Of Birth          1993        Visit Information        Provider Department      7/18/2017 3:30 PM Bo Oconnor MD [facility]        Today's Diagnoses     Bipolar I disorder, most recent episode (or current) manic (H)        Bipolar I disorder, most recent episode depressed (H)        Bipolar disorder, current episode mixed, mild (H)        Attention deficit hyperactivity disorder (ADHD), combined type        Bipolar disorder in full remission, most recent episode unspecified type (H)        Generalized anxiety disorder          Care Instructions    Thank you for coming to the [facility].    Lab Testing:  If you had lab testing today and your results are reassuring or normal they will be mailed to you or sent through [employer] within 7 days.   If the lab tests need quick action we will call you with the results.  The phone number we will call with results is # [phone] (home) . If this is not the best number please call our clinic and change the number.    Medication Refills:  If you need any refills please call your pharmacy and they will contact us. Our fax number for refills is [phone]. Please allow three business for refill processing.   If you need to  your refill at a new pharmacy, please contact the new pharmacy directly. The new pharmacy will help you get your medications transferred.     Scheduling:  If you have any concerns about today's visit or wish to schedule another appointment please call our office during normal business hours [phone] (8-5:00 M-F)    Contact Us:  Please call [phone] during business hours (8-5:00 M-F).  If after clinic hours, or on the weekend, please call  [phone].    Financial Assistance [phone]  [facility] Billing [phone]  [facility]illing [phone]  Medical Records [phone]      MENTAL  PT WAS WHEELED DOWNSTAIRS BY ME PERSONALLY, WIFE DROVE HIM HOME HEALTH CRISIS NUMBERS:  [facility]:   [facility] - [phone]   Crisis Residence [facility] - [phone]   [facility] hospitals - [phone]   COPE 24/7 [place] Mobile Team for Adults - [[phone]]; Child - [[phone]]     Crisis Connection - [phone]     [facility] - [phone]   Walk-in counseling [facility] - [phone]   Walk-in counseling [facility] - [phone]   Crisis Residence [facility] - [phone]   Urgent Care Adult Mental Health:   --Drop-in, 24/7 crisis line, and [place] Mobile Team [[phone]]    CRISIS TEXT LINE: Text [phone] from anywhere, anytime, any crisis 24/7;    OR SEE www.crisistextline.org     Poison Control Center - [phone]    CHILD: [facility] needs assessment team - [phone]     [facility] Lifeline - [phone]; or "[employer] Lifeline - 1-252.833.7550    If you have a medical emergency please call 911or go to the nearest ER.                    _____________________________________________    Again thank you for choosing [facility]IN[facility] and please let us know how we can best partner with you to improve you and your family's health.  You may be receiving a survey in the mail regarding this appointment. We would love to have your feedback, both positive and negative, so please fill out the survey and return it using the provided envolpe. The survey is done by an external company, so your answers are anonymous.             Follow-ups after your visit        Your next 10 appointments already scheduled     Oct 18, 2017  2:00 PM CDT   Adult Med Follow UP with Bo Oconnor MD   Psychiatry Clinic ([facility])    [address] [facility] [address]   [phone]              Who to contact     Please call your clinic at [phone] to:    Ask  questions about your health    Make or cancel appointments    Discuss your medicines    Learn about your test results    Speak to your doctor   If you have compliments or concerns about an experience at your clinic, or if you wish to file a complaint, please contact [facility] Patient Relations at [phone] or email us at [email or web address]         Additional Information About Your Visit        Job App Plushart Information     Keemotion[employer] is an electronic gateway that provides easy, online access to your medical records. With Chicfy, you can request a clinic appointment, read your test results, renew a prescription or communicate with your care team.     To sign up for Chicfy visit the website at [email or web address]   You will be asked to enter the access code listed below, as well as some personal information. Please follow the directions to create your username and password.     Your access code is: [email or web address]  Expires: 10/16/2017  3:59 PM     Your access code will  in 90 days. If you need help or a new code, please contact your [facility] or call [phone] for assistance.        Care EveryWhere ID     This is your Care EveryWhere ID. This could be used by other organizations to access your [place] medical records  [id number]        Your Vitals Were     Pulse BMI (Body Mass Index)                81 35.05 kg/m2           Blood Pressure from Last 3 Encounters:   17 (!) 145/97   17 (!) 144/97   17 143/84    Weight from Last 3 Encounters:   17 101.5 kg (223 lb 12.8 oz)   17 94.9 kg (209 lb 3.2 oz)   17 93.9 kg (207 lb)              Today, you had the following     No orders found for display         Today's Medication Changes          These changes are accurate as of: 17  3:59 PM.  If you have any questions, ask your nurse or doctor.               These medicines have changed or have updated  prescriptions.        Dose/Directions    cloNIDine 0.1 MG tablet   Commonly known as:  CATAPRES   This may have changed:  additional instructions   Used for:  Attention deficit hyperactivity disorder (ADHD), combined type, Bipolar disorder in full remission, most recent episode unspecified type (H), Generalized anxiety disorder   Changed by:  Bo Oconnor MD        Take 2 tabs (0.2 mg) po q HS.   Quantity:  180 tablet   Refills:  0            Where to get your medicines      These medications were sent to [facility] - [facility], [place] [address] [facility] [address] AT [facility] [address]  [address] [facility] [address], [facility] [address]     Phone:  [phone]     cloNIDine 0.1 MG tablet    divalproex 500 MG 24 hr tablet    gabapentin 100 MG capsule    gabapentin 400 MG capsule    lamoTRIgine 25 MG tablet                Primary Care Provider Office Phone # Fax #    [facility] [phone] [address] [facility] [address]        Equal Access to Services     AVELINO COLON AH: Hadii aad ku hadasho Soomaali, waaxda luqadaha, qaybta kaalmada adeegyada, waxay idiin hayaan cynthia coto . So [facility] [phone].    ATENCIÓN: Si habla español, tiene a simons disposición servicios gratuitos de asistencia lingüística. [facility] [phone].    We comply with applicable federal civil rights laws and [place] laws. We do not discriminate on the basis of race, color, national origin, age, disability sex, sexual orientation or gender identity.            Thank you!     Thank you for choosing [facility]  for your care. Our goal is always to provide you with excellent care. Hearing back from our patients is one way we can continue to improve our services. Please take a few minutes to complete the written survey that you may receive in the mail after your visit with us. Thank you!             Your Updated Medication List - Protect others around you: Learn how to  safely use, store and throw away your medicines at www.disposemymeds.org.          This list is accurate as of: 7/18/17  3:59 PM.  Always use your most recent med list.                   Brand Name Dispense Instructions for use Diagnosis    cloNIDine 0.1 MG tablet    CATAPRES    180 tablet    Take 2 tabs (0.2 mg) po q HS.    Attention deficit hyperactivity disorder (ADHD), combined type, Bipolar disorder in full remission, most recent episode unspecified type (H), Generalized anxiety disorder       divalproex 500 MG 24 hr tablet    DEPAKOTE ER    360 tablet    Take 4 tablets (2,000 mg) by mouth daily    Bipolar disorder, current episode mixed, mild (H)       * gabapentin 400 MG capsule    NEURONTIN    90 capsule    Take 1 capsule (400 mg) by mouth At Bedtime .  In addition, take two 100 mg capt po q AM.  Daily total of 600 mg.    Bipolar I disorder, most recent episode depressed (H)       * gabapentin 100 MG capsule    NEURONTIN    120 capsule    Take 2 caps (200 mg) po q AM.  In addition, take one 400 mg cap at HS.  Daily total of 600 mg.    Bipolar I disorder, most recent episode depressed (H)       lamoTRIgine 25 MG tablet    LaMICtal    180 tablet    Take 2 tablets (50 mg) by mouth At Bedtime    Bipolar I disorder, most recent episode (or current) manic (H)       nicotine polacrilex 2 MG gum    EQ NICOTINE    60 tablet    Place 1 each (2 mg) inside cheek as needed for smoking cessation    Tobacco abuse       omeprazole 20 MG CR capsule    priLOSEC    30 capsule    Take 1 capsule (20 mg) by mouth every morning (before breakfast)    Gastroesophageal reflux disease without esophagitis       * Notice:  This list has 2 medication(s) that are the same as other medications prescribed for you. Read the directions carefully, and ask your doctor or other care provider to review them with you.

## 2018-03-12 ENCOUNTER — HOSPITAL ENCOUNTER (OUTPATIENT)
Dept: HOSPITAL 84 - D.LABREF | Age: 78
Discharge: HOME | End: 2018-03-12
Attending: INTERNAL MEDICINE
Payer: MEDICARE

## 2018-03-12 VITALS — BODY MASS INDEX: 29.9 KG/M2

## 2018-03-12 DIAGNOSIS — R06.00: Primary | ICD-10-CM

## 2018-03-12 LAB
BASOPHILS NFR BLD AUTO: 0.2 % (ref 0–2)
EOSINOPHIL NFR BLD: 3.6 % (ref 0–7)
ERYTHROCYTE [DISTWIDTH] IN BLOOD BY AUTOMATED COUNT: 13.9 % (ref 11.5–14.5)
HCT VFR BLD CALC: 44.5 % (ref 42–54)
HGB BLD-MCNC: 14 G/DL (ref 13.5–17.5)
IMM GRANULOCYTES NFR BLD: 0.3 % (ref 0–5)
LYMPHOCYTES NFR BLD AUTO: 21.9 % (ref 15–50)
MCH RBC QN AUTO: 32.1 PG (ref 26–34)
MCHC RBC AUTO-ENTMCNC: 31.5 G/DL (ref 31–37)
MCV RBC: 102.1 FL (ref 80–100)
MONOCYTES NFR BLD: 3.9 % (ref 2–11)
NEUTROPHILS NFR BLD AUTO: 70.1 % (ref 40–80)
PLATELET # BLD: 193 10X3/UL (ref 130–400)
PMV BLD AUTO: 10.9 FL (ref 7.4–10.4)
RBC # BLD AUTO: 4.36 10X6/UL (ref 4.2–6.1)
WBC # BLD AUTO: 6.5 10X3/UL (ref 4.8–10.8)

## 2018-03-24 ENCOUNTER — HOSPITAL ENCOUNTER (EMERGENCY)
Dept: HOSPITAL 84 - D.ER | Age: 78
Discharge: HOME | End: 2018-03-24
Payer: MEDICARE

## 2018-03-24 VITALS — BODY MASS INDEX: 29.9 KG/M2

## 2018-03-24 DIAGNOSIS — Z86.79: ICD-10-CM

## 2018-03-24 DIAGNOSIS — R60.0: ICD-10-CM

## 2018-03-24 DIAGNOSIS — Z87.09: ICD-10-CM

## 2018-03-24 DIAGNOSIS — I48.91: Primary | ICD-10-CM

## 2018-03-24 DIAGNOSIS — I10: ICD-10-CM

## 2018-03-24 DIAGNOSIS — R06.02: ICD-10-CM

## 2018-03-24 DIAGNOSIS — F17.200: ICD-10-CM

## 2018-03-24 LAB
ALBUMIN SERPL-MCNC: 3.1 G/DL (ref 3.4–5)
ALP SERPL-CCNC: 29 U/L (ref 46–116)
ALT SERPL-CCNC: 23 U/L (ref 10–68)
ANION GAP SERPL CALC-SCNC: 9.8 MMOL/L (ref 8–16)
APPEARANCE UR: CLEAR
BASOPHILS NFR BLD AUTO: 0.3 % (ref 0–2)
BILIRUB SERPL-MCNC: 0.6 MG/DL (ref 0.2–1.3)
BILIRUB SERPL-MCNC: NEGATIVE MG/DL
BUN SERPL-MCNC: 42 MG/DL (ref 7–18)
CALCIUM SERPL-MCNC: 9.1 MG/DL (ref 8.5–10.1)
CHLORIDE SERPL-SCNC: 99 MMOL/L (ref 98–107)
CO2 SERPL-SCNC: 35.3 MMOL/L (ref 21–32)
COLOR UR: YELLOW
CREAT SERPL-MCNC: 1.8 MG/DL (ref 0.6–1.3)
EOSINOPHIL NFR BLD: 0.6 % (ref 0–7)
ERYTHROCYTE [DISTWIDTH] IN BLOOD BY AUTOMATED COUNT: 14.9 % (ref 11.5–14.5)
GLOBULIN SER-MCNC: 3.6 G/L
GLUCOSE SERPL-MCNC: 244 MG/DL (ref 74–106)
GLUCOSE SERPL-MCNC: NEGATIVE MG/DL
HCT VFR BLD CALC: 46.7 % (ref 42–54)
HGB BLD-MCNC: 14.7 G/DL (ref 13.5–17.5)
IMM GRANULOCYTES NFR BLD: 0.4 % (ref 0–5)
KETONES UR STRIP-MCNC: NEGATIVE MG/DL
LYMPHOCYTES NFR BLD AUTO: 21.7 % (ref 15–50)
MCH RBC QN AUTO: 32.2 PG (ref 26–34)
MCHC RBC AUTO-ENTMCNC: 31.5 G/DL (ref 31–37)
MCV RBC: 102.4 FL (ref 80–100)
MONOCYTES NFR BLD: 8.6 % (ref 2–11)
NEUTROPHILS NFR BLD AUTO: 68.4 % (ref 40–80)
NITRITE UR-MCNC: NEGATIVE MG/ML
OSMOLALITY SERPL CALC.SUM OF ELEC: 297 MOSM/KG (ref 275–300)
PH UR STRIP: 7 [PH] (ref 5–6)
PLATELET # BLD: 173 10X3/UL (ref 130–400)
PMV BLD AUTO: 10.9 FL (ref 7.4–10.4)
POTASSIUM SERPL-SCNC: 4.1 MMOL/L (ref 3.5–5.1)
PROT SERPL-MCNC: 6.7 G/DL (ref 6.4–8.2)
PROT UR-MCNC: (no result) MG/DL
RBC # BLD AUTO: 4.56 10X6/UL (ref 4.2–6.1)
SODIUM SERPL-SCNC: 140 MMOL/L (ref 136–145)
SP GR UR STRIP: 1.01 (ref 1–1.02)
TROPONIN I SERPL-MCNC: 0.02 NG/ML (ref 0–0.06)
UROBILINOGEN UR-MCNC: NORMAL MG/DL
WBC # BLD AUTO: 6.8 10X3/UL (ref 4.8–10.8)

## 2018-04-09 ENCOUNTER — HOSPITAL ENCOUNTER (INPATIENT)
Dept: HOSPITAL 84 - D.ER | Age: 78
LOS: 11 days | Discharge: HOME HEALTH | DRG: 177 | End: 2018-04-20
Attending: FAMILY MEDICINE | Admitting: FAMILY MEDICINE
Payer: MEDICARE

## 2018-04-09 VITALS
BODY MASS INDEX: 31.48 KG/M2 | BODY MASS INDEX: 31.48 KG/M2 | HEIGHT: 64 IN | HEIGHT: 64 IN | WEIGHT: 184.39 LBS | BODY MASS INDEX: 31.48 KG/M2 | WEIGHT: 184.39 LBS

## 2018-04-09 VITALS — DIASTOLIC BLOOD PRESSURE: 89 MMHG | SYSTOLIC BLOOD PRESSURE: 130 MMHG

## 2018-04-09 VITALS — SYSTOLIC BLOOD PRESSURE: 100 MMHG | DIASTOLIC BLOOD PRESSURE: 66 MMHG

## 2018-04-09 VITALS — SYSTOLIC BLOOD PRESSURE: 90 MMHG | DIASTOLIC BLOOD PRESSURE: 61 MMHG

## 2018-04-09 VITALS — SYSTOLIC BLOOD PRESSURE: 102 MMHG | DIASTOLIC BLOOD PRESSURE: 60 MMHG

## 2018-04-09 VITALS — DIASTOLIC BLOOD PRESSURE: 98 MMHG | SYSTOLIC BLOOD PRESSURE: 100 MMHG

## 2018-04-09 VITALS — SYSTOLIC BLOOD PRESSURE: 138 MMHG | DIASTOLIC BLOOD PRESSURE: 72 MMHG

## 2018-04-09 VITALS — DIASTOLIC BLOOD PRESSURE: 72 MMHG | SYSTOLIC BLOOD PRESSURE: 138 MMHG

## 2018-04-09 VITALS — SYSTOLIC BLOOD PRESSURE: 135 MMHG | DIASTOLIC BLOOD PRESSURE: 99 MMHG

## 2018-04-09 VITALS — SYSTOLIC BLOOD PRESSURE: 100 MMHG | DIASTOLIC BLOOD PRESSURE: 72 MMHG

## 2018-04-09 VITALS — DIASTOLIC BLOOD PRESSURE: 71 MMHG | SYSTOLIC BLOOD PRESSURE: 137 MMHG

## 2018-04-09 VITALS — DIASTOLIC BLOOD PRESSURE: 68 MMHG | SYSTOLIC BLOOD PRESSURE: 122 MMHG

## 2018-04-09 VITALS — DIASTOLIC BLOOD PRESSURE: 80 MMHG | SYSTOLIC BLOOD PRESSURE: 115 MMHG

## 2018-04-09 VITALS — SYSTOLIC BLOOD PRESSURE: 114 MMHG | DIASTOLIC BLOOD PRESSURE: 82 MMHG

## 2018-04-09 VITALS — SYSTOLIC BLOOD PRESSURE: 96 MMHG | DIASTOLIC BLOOD PRESSURE: 66 MMHG

## 2018-04-09 VITALS — DIASTOLIC BLOOD PRESSURE: 79 MMHG | SYSTOLIC BLOOD PRESSURE: 128 MMHG

## 2018-04-09 VITALS — DIASTOLIC BLOOD PRESSURE: 83 MMHG | SYSTOLIC BLOOD PRESSURE: 126 MMHG

## 2018-04-09 VITALS — DIASTOLIC BLOOD PRESSURE: 78 MMHG | SYSTOLIC BLOOD PRESSURE: 112 MMHG

## 2018-04-09 VITALS — SYSTOLIC BLOOD PRESSURE: 130 MMHG | DIASTOLIC BLOOD PRESSURE: 74 MMHG

## 2018-04-09 VITALS — SYSTOLIC BLOOD PRESSURE: 135 MMHG | DIASTOLIC BLOOD PRESSURE: 87 MMHG

## 2018-04-09 VITALS — DIASTOLIC BLOOD PRESSURE: 92 MMHG | SYSTOLIC BLOOD PRESSURE: 134 MMHG

## 2018-04-09 VITALS — DIASTOLIC BLOOD PRESSURE: 83 MMHG | SYSTOLIC BLOOD PRESSURE: 137 MMHG

## 2018-04-09 VITALS — SYSTOLIC BLOOD PRESSURE: 97 MMHG | DIASTOLIC BLOOD PRESSURE: 69 MMHG

## 2018-04-09 DIAGNOSIS — I97.710: ICD-10-CM

## 2018-04-09 DIAGNOSIS — J30.9: ICD-10-CM

## 2018-04-09 DIAGNOSIS — I27.20: ICD-10-CM

## 2018-04-09 DIAGNOSIS — D72.829: ICD-10-CM

## 2018-04-09 DIAGNOSIS — F17.200: ICD-10-CM

## 2018-04-09 DIAGNOSIS — J13: ICD-10-CM

## 2018-04-09 DIAGNOSIS — G47.33: ICD-10-CM

## 2018-04-09 DIAGNOSIS — M19.90: ICD-10-CM

## 2018-04-09 DIAGNOSIS — Z99.81: ICD-10-CM

## 2018-04-09 DIAGNOSIS — I48.91: ICD-10-CM

## 2018-04-09 DIAGNOSIS — K57.90: ICD-10-CM

## 2018-04-09 DIAGNOSIS — J15.6: Primary | ICD-10-CM

## 2018-04-09 DIAGNOSIS — J96.22: ICD-10-CM

## 2018-04-09 DIAGNOSIS — I50.33: ICD-10-CM

## 2018-04-09 DIAGNOSIS — N17.9: ICD-10-CM

## 2018-04-09 DIAGNOSIS — J44.0: ICD-10-CM

## 2018-04-09 DIAGNOSIS — Z66: ICD-10-CM

## 2018-04-09 DIAGNOSIS — I34.0: ICD-10-CM

## 2018-04-09 DIAGNOSIS — N40.0: ICD-10-CM

## 2018-04-09 DIAGNOSIS — I08.1: ICD-10-CM

## 2018-04-09 DIAGNOSIS — I25.10: ICD-10-CM

## 2018-04-09 DIAGNOSIS — N18.9: ICD-10-CM

## 2018-04-09 DIAGNOSIS — J44.1: ICD-10-CM

## 2018-04-09 DIAGNOSIS — I13.0: ICD-10-CM

## 2018-04-09 DIAGNOSIS — E87.5: ICD-10-CM

## 2018-04-09 DIAGNOSIS — J96.21: ICD-10-CM

## 2018-04-09 DIAGNOSIS — J15.212: ICD-10-CM

## 2018-04-09 DIAGNOSIS — E78.5: ICD-10-CM

## 2018-04-09 DIAGNOSIS — Z95.5: ICD-10-CM

## 2018-04-09 LAB
ALBUMIN SERPL-MCNC: 3.3 G/DL (ref 3.4–5)
ALP SERPL-CCNC: 28 U/L (ref 46–116)
ALT SERPL-CCNC: 19 U/L (ref 10–68)
ANION GAP SERPL CALC-SCNC: 15.1 MMOL/L (ref 8–16)
APPEARANCE UR: CLEAR
BASOPHILS NFR BLD AUTO: 0.1 % (ref 0–2)
BILIRUB SERPL-MCNC: 1 MG/DL (ref 0.2–1.3)
BILIRUB SERPL-MCNC: NEGATIVE MG/DL
BUN SERPL-MCNC: 42 MG/DL (ref 7–18)
CALCIUM SERPL-MCNC: 9.5 MG/DL (ref 8.5–10.1)
CHLORIDE SERPL-SCNC: 100 MMOL/L (ref 98–107)
CK MB SERPL-MCNC: 1.6 U/L (ref 0–3.6)
CK SERPL-CCNC: 56 UL (ref 21–232)
CO2 SERPL-SCNC: 32.7 MMOL/L (ref 21–32)
COLOR UR: (no result)
CREAT SERPL-MCNC: 2.1 MG/DL (ref 0.6–1.3)
EOSINOPHIL NFR BLD: 0.2 % (ref 0–7)
ERYTHROCYTE [DISTWIDTH] IN BLOOD BY AUTOMATED COUNT: 14.8 % (ref 11.5–14.5)
GLOBULIN SER-MCNC: 3.8 G/L
GLUCOSE SERPL-MCNC: 215 MG/DL (ref 74–106)
GLUCOSE SERPL-MCNC: NEGATIVE MG/DL
HCT VFR BLD CALC: 49.1 % (ref 42–54)
HGB BLD-MCNC: 15.8 G/DL (ref 13.5–17.5)
IMM GRANULOCYTES NFR BLD: 0.2 % (ref 0–5)
KETONES UR STRIP-MCNC: NEGATIVE MG/DL
LYMPHOCYTES NFR BLD AUTO: 9.4 % (ref 15–50)
MCH RBC QN AUTO: 33.2 PG (ref 26–34)
MCHC RBC AUTO-ENTMCNC: 32.2 G/DL (ref 31–37)
MCV RBC: 103.2 FL (ref 80–100)
MONOCYTES NFR BLD: 6 % (ref 2–11)
NEUTROPHILS NFR BLD AUTO: 84.1 % (ref 40–80)
NITRITE UR-MCNC: NEGATIVE MG/ML
NT-PROBNP SERPL-MCNC: 3993 PG/ML (ref 0–450)
OSMOLALITY SERPL CALC.SUM OF ELEC: 301 MOSM/KG (ref 275–300)
PH UR STRIP: 5 [PH] (ref 5–6)
PLATELET # BLD: 202 10X3/UL (ref 130–400)
PMV BLD AUTO: 11.3 FL (ref 7.4–10.4)
POTASSIUM SERPL-SCNC: 4.8 MMOL/L (ref 3.5–5.1)
PROT SERPL-MCNC: 7.1 G/DL (ref 6.4–8.2)
PROT UR-MCNC: NEGATIVE MG/DL
RBC # BLD AUTO: 4.76 10X6/UL (ref 4.2–6.1)
SODIUM SERPL-SCNC: 143 MMOL/L (ref 136–145)
SP GR UR STRIP: 1.02 (ref 1–1.02)
TROPONIN I SERPL-MCNC: 0.02 NG/ML (ref 0–0.06)
UROBILINOGEN UR-MCNC: NORMAL MG/DL
WBC # BLD AUTO: 16.3 10X3/UL (ref 4.8–10.8)

## 2018-04-09 PROCEDURE — 5A09357 ASSISTANCE WITH RESPIRATORY VENTILATION, LESS THAN 24 CONSECUTIVE HOURS, CONTINUOUS POSITIVE AIRWAY PRESSURE: ICD-10-PCS | Performed by: FAMILY MEDICINE

## 2018-04-10 VITALS — SYSTOLIC BLOOD PRESSURE: 110 MMHG | DIASTOLIC BLOOD PRESSURE: 85 MMHG

## 2018-04-10 VITALS — DIASTOLIC BLOOD PRESSURE: 94 MMHG | SYSTOLIC BLOOD PRESSURE: 143 MMHG

## 2018-04-10 VITALS — DIASTOLIC BLOOD PRESSURE: 94 MMHG | SYSTOLIC BLOOD PRESSURE: 139 MMHG

## 2018-04-10 VITALS — DIASTOLIC BLOOD PRESSURE: 89 MMHG | SYSTOLIC BLOOD PRESSURE: 131 MMHG

## 2018-04-10 VITALS — DIASTOLIC BLOOD PRESSURE: 91 MMHG | SYSTOLIC BLOOD PRESSURE: 156 MMHG

## 2018-04-10 VITALS — SYSTOLIC BLOOD PRESSURE: 106 MMHG | DIASTOLIC BLOOD PRESSURE: 66 MMHG

## 2018-04-10 VITALS — SYSTOLIC BLOOD PRESSURE: 160 MMHG | DIASTOLIC BLOOD PRESSURE: 92 MMHG

## 2018-04-10 VITALS — DIASTOLIC BLOOD PRESSURE: 87 MMHG | SYSTOLIC BLOOD PRESSURE: 144 MMHG

## 2018-04-10 VITALS — SYSTOLIC BLOOD PRESSURE: 142 MMHG | DIASTOLIC BLOOD PRESSURE: 90 MMHG

## 2018-04-10 VITALS — DIASTOLIC BLOOD PRESSURE: 72 MMHG | SYSTOLIC BLOOD PRESSURE: 101 MMHG

## 2018-04-10 VITALS — DIASTOLIC BLOOD PRESSURE: 80 MMHG | SYSTOLIC BLOOD PRESSURE: 127 MMHG

## 2018-04-10 VITALS — SYSTOLIC BLOOD PRESSURE: 156 MMHG | DIASTOLIC BLOOD PRESSURE: 84 MMHG

## 2018-04-10 VITALS — SYSTOLIC BLOOD PRESSURE: 143 MMHG | DIASTOLIC BLOOD PRESSURE: 88 MMHG

## 2018-04-10 VITALS — DIASTOLIC BLOOD PRESSURE: 99 MMHG | SYSTOLIC BLOOD PRESSURE: 133 MMHG

## 2018-04-10 VITALS — DIASTOLIC BLOOD PRESSURE: 99 MMHG | SYSTOLIC BLOOD PRESSURE: 147 MMHG

## 2018-04-10 VITALS — SYSTOLIC BLOOD PRESSURE: 147 MMHG | DIASTOLIC BLOOD PRESSURE: 83 MMHG

## 2018-04-10 VITALS — SYSTOLIC BLOOD PRESSURE: 131 MMHG | DIASTOLIC BLOOD PRESSURE: 81 MMHG

## 2018-04-10 VITALS — DIASTOLIC BLOOD PRESSURE: 94 MMHG | SYSTOLIC BLOOD PRESSURE: 148 MMHG

## 2018-04-10 VITALS — SYSTOLIC BLOOD PRESSURE: 134 MMHG | DIASTOLIC BLOOD PRESSURE: 90 MMHG

## 2018-04-10 VITALS — SYSTOLIC BLOOD PRESSURE: 129 MMHG | DIASTOLIC BLOOD PRESSURE: 72 MMHG

## 2018-04-10 VITALS — DIASTOLIC BLOOD PRESSURE: 92 MMHG | SYSTOLIC BLOOD PRESSURE: 139 MMHG

## 2018-04-10 VITALS — DIASTOLIC BLOOD PRESSURE: 79 MMHG | SYSTOLIC BLOOD PRESSURE: 143 MMHG

## 2018-04-10 VITALS — SYSTOLIC BLOOD PRESSURE: 148 MMHG | DIASTOLIC BLOOD PRESSURE: 95 MMHG

## 2018-04-10 VITALS — SYSTOLIC BLOOD PRESSURE: 147 MMHG | DIASTOLIC BLOOD PRESSURE: 78 MMHG

## 2018-04-10 LAB
ANION GAP SERPL CALC-SCNC: 11.7 MMOL/L (ref 8–16)
BASOPHILS NFR BLD AUTO: 0 % (ref 0–2)
BUN SERPL-MCNC: 43 MG/DL (ref 7–18)
CALCIUM SERPL-MCNC: 8.9 MG/DL (ref 8.5–10.1)
CHLORIDE SERPL-SCNC: 96 MMOL/L (ref 98–107)
CK MB SERPL-MCNC: 1.6 U/L (ref 0–3.6)
CK MB SERPL-MCNC: 1.6 U/L (ref 0–3.6)
CK SERPL-CCNC: 33 UL (ref 21–232)
CK SERPL-CCNC: 35 UL (ref 21–232)
CO2 SERPL-SCNC: 35.8 MMOL/L (ref 21–32)
CREAT SERPL-MCNC: 1.9 MG/DL (ref 0.6–1.3)
EOSINOPHIL NFR BLD: 0 % (ref 0–7)
ERYTHROCYTE [DISTWIDTH] IN BLOOD BY AUTOMATED COUNT: 14.4 % (ref 11.5–14.5)
GLUCOSE SERPL-MCNC: 252 MG/DL (ref 74–106)
HCT VFR BLD CALC: 43.2 % (ref 42–54)
HGB BLD-MCNC: 13.6 G/DL (ref 13.5–17.5)
IMM GRANULOCYTES NFR BLD: 0.3 % (ref 0–5)
LYMPHOCYTES NFR BLD AUTO: 5.7 % (ref 15–50)
MCH RBC QN AUTO: 31.9 PG (ref 26–34)
MCHC RBC AUTO-ENTMCNC: 31.5 G/DL (ref 31–37)
MCV RBC: 101.4 FL (ref 80–100)
MONOCYTES NFR BLD: 2.5 % (ref 2–11)
NEUTROPHILS NFR BLD AUTO: 91.5 % (ref 40–80)
OSMOLALITY SERPL CALC.SUM OF ELEC: 297 MOSM/KG (ref 275–300)
PLATELET # BLD: 149 10X3/UL (ref 130–400)
PMV BLD AUTO: 11.3 FL (ref 7.4–10.4)
POTASSIUM SERPL-SCNC: 4.5 MMOL/L (ref 3.5–5.1)
RBC # BLD AUTO: 4.26 10X6/UL (ref 4.2–6.1)
SODIUM SERPL-SCNC: 139 MMOL/L (ref 136–145)
TROPONIN I SERPL-MCNC: 0.06 NG/ML (ref 0–0.06)
TROPONIN I SERPL-MCNC: 0.07 NG/ML (ref 0–0.06)
WBC # BLD AUTO: 8.6 10X3/UL (ref 4.8–10.8)

## 2018-04-11 VITALS — DIASTOLIC BLOOD PRESSURE: 76 MMHG | SYSTOLIC BLOOD PRESSURE: 139 MMHG

## 2018-04-11 VITALS — SYSTOLIC BLOOD PRESSURE: 149 MMHG | DIASTOLIC BLOOD PRESSURE: 97 MMHG

## 2018-04-11 VITALS — DIASTOLIC BLOOD PRESSURE: 98 MMHG | SYSTOLIC BLOOD PRESSURE: 157 MMHG

## 2018-04-11 VITALS — DIASTOLIC BLOOD PRESSURE: 106 MMHG | SYSTOLIC BLOOD PRESSURE: 145 MMHG

## 2018-04-11 VITALS — SYSTOLIC BLOOD PRESSURE: 139 MMHG | DIASTOLIC BLOOD PRESSURE: 101 MMHG

## 2018-04-11 VITALS — DIASTOLIC BLOOD PRESSURE: 55 MMHG | SYSTOLIC BLOOD PRESSURE: 100 MMHG

## 2018-04-11 VITALS — SYSTOLIC BLOOD PRESSURE: 158 MMHG | DIASTOLIC BLOOD PRESSURE: 89 MMHG

## 2018-04-11 VITALS — SYSTOLIC BLOOD PRESSURE: 99 MMHG | DIASTOLIC BLOOD PRESSURE: 64 MMHG

## 2018-04-11 VITALS — DIASTOLIC BLOOD PRESSURE: 81 MMHG | SYSTOLIC BLOOD PRESSURE: 137 MMHG

## 2018-04-11 VITALS — SYSTOLIC BLOOD PRESSURE: 138 MMHG | DIASTOLIC BLOOD PRESSURE: 83 MMHG

## 2018-04-11 VITALS — DIASTOLIC BLOOD PRESSURE: 100 MMHG | SYSTOLIC BLOOD PRESSURE: 164 MMHG

## 2018-04-11 VITALS — SYSTOLIC BLOOD PRESSURE: 154 MMHG | DIASTOLIC BLOOD PRESSURE: 90 MMHG

## 2018-04-11 VITALS — SYSTOLIC BLOOD PRESSURE: 148 MMHG | DIASTOLIC BLOOD PRESSURE: 98 MMHG

## 2018-04-11 VITALS — DIASTOLIC BLOOD PRESSURE: 82 MMHG | SYSTOLIC BLOOD PRESSURE: 148 MMHG

## 2018-04-11 VITALS — SYSTOLIC BLOOD PRESSURE: 137 MMHG | DIASTOLIC BLOOD PRESSURE: 93 MMHG

## 2018-04-11 VITALS — SYSTOLIC BLOOD PRESSURE: 156 MMHG | DIASTOLIC BLOOD PRESSURE: 85 MMHG

## 2018-04-11 VITALS — DIASTOLIC BLOOD PRESSURE: 110 MMHG | SYSTOLIC BLOOD PRESSURE: 154 MMHG

## 2018-04-11 VITALS — DIASTOLIC BLOOD PRESSURE: 109 MMHG | SYSTOLIC BLOOD PRESSURE: 161 MMHG

## 2018-04-11 VITALS — SYSTOLIC BLOOD PRESSURE: 154 MMHG | DIASTOLIC BLOOD PRESSURE: 85 MMHG

## 2018-04-11 VITALS — DIASTOLIC BLOOD PRESSURE: 96 MMHG | SYSTOLIC BLOOD PRESSURE: 140 MMHG

## 2018-04-11 VITALS — SYSTOLIC BLOOD PRESSURE: 146 MMHG | DIASTOLIC BLOOD PRESSURE: 81 MMHG

## 2018-04-11 VITALS — SYSTOLIC BLOOD PRESSURE: 144 MMHG | DIASTOLIC BLOOD PRESSURE: 93 MMHG

## 2018-04-11 VITALS — DIASTOLIC BLOOD PRESSURE: 98 MMHG | SYSTOLIC BLOOD PRESSURE: 155 MMHG

## 2018-04-11 VITALS — SYSTOLIC BLOOD PRESSURE: 148 MMHG | DIASTOLIC BLOOD PRESSURE: 86 MMHG

## 2018-04-11 LAB
ANION GAP SERPL CALC-SCNC: 9.9 MMOL/L (ref 8–16)
BASOPHILS NFR BLD AUTO: 0 % (ref 0–2)
BASOPHILS NFR BLD AUTO: 0.1 % (ref 0–2)
BUN SERPL-MCNC: 43 MG/DL (ref 7–18)
CALCIUM SERPL-MCNC: 9.2 MG/DL (ref 8.5–10.1)
CHLORIDE SERPL-SCNC: 96 MMOL/L (ref 98–107)
CK MB SERPL-MCNC: 1.3 U/L (ref 0–3.6)
CK SERPL-CCNC: 80 UL (ref 21–232)
CO2 SERPL-SCNC: 36.5 MMOL/L (ref 21–32)
CREAT SERPL-MCNC: 1.6 MG/DL (ref 0.6–1.3)
EOSINOPHIL NFR BLD: 0 % (ref 0–7)
EOSINOPHIL NFR BLD: 0 % (ref 0–7)
ERYTHROCYTE [DISTWIDTH] IN BLOOD BY AUTOMATED COUNT: 14.4 % (ref 11.5–14.5)
ERYTHROCYTE [DISTWIDTH] IN BLOOD BY AUTOMATED COUNT: 15 % (ref 11.5–14.5)
GLUCOSE SERPL-MCNC: 215 MG/DL (ref 74–106)
HCT VFR BLD CALC: 41.4 % (ref 42–54)
HCT VFR BLD CALC: 46.6 % (ref 42–54)
HGB BLD-MCNC: 14.2 G/DL (ref 13.5–17.5)
HGB BLD-MCNC: 14.8 G/DL (ref 13.5–17.5)
IMM GRANULOCYTES NFR BLD: 0.2 % (ref 0–5)
IMM GRANULOCYTES NFR BLD: 0.3 % (ref 0–5)
LYMPHOCYTES NFR BLD AUTO: 5.7 % (ref 15–50)
LYMPHOCYTES NFR BLD AUTO: 5.8 % (ref 15–50)
MCH RBC QN AUTO: 32.8 PG (ref 26–34)
MCH RBC QN AUTO: 34.4 PG (ref 26–34)
MCHC RBC AUTO-ENTMCNC: 31.8 G/DL (ref 31–37)
MCHC RBC AUTO-ENTMCNC: 34.3 G/DL (ref 31–37)
MCV RBC: 100.2 FL (ref 80–100)
MCV RBC: 103.3 FL (ref 80–100)
MONOCYTES NFR BLD: 3.2 % (ref 2–11)
MONOCYTES NFR BLD: 4.2 % (ref 2–11)
NEUTROPHILS NFR BLD AUTO: 89.8 % (ref 40–80)
NEUTROPHILS NFR BLD AUTO: 90.7 % (ref 40–80)
OSMOLALITY SERPL CALC.SUM OF ELEC: 290 MOSM/KG (ref 275–300)
PLATELET # BLD: 171 10X3/UL (ref 130–400)
PLATELET # BLD: 193 10X3/UL (ref 130–400)
PMV BLD AUTO: 11.4 FL (ref 7.4–10.4)
PMV BLD AUTO: 12.5 FL (ref 7.4–10.4)
POTASSIUM SERPL-SCNC: 5.4 MMOL/L (ref 3.5–5.1)
RBC # BLD AUTO: 4.13 10X6/UL (ref 4.2–6.1)
RBC # BLD AUTO: 4.51 10X6/UL (ref 4.2–6.1)
SODIUM SERPL-SCNC: 137 MMOL/L (ref 136–145)
TROPONIN I SERPL-MCNC: 0.06 NG/ML (ref 0–0.06)
WBC # BLD AUTO: 10 10X3/UL (ref 4.8–10.8)
WBC # BLD AUTO: 12.9 10X3/UL (ref 4.8–10.8)

## 2018-04-11 PROCEDURE — 0BH17EZ INSERTION OF ENDOTRACHEAL AIRWAY INTO TRACHEA, VIA NATURAL OR ARTIFICIAL OPENING: ICD-10-PCS | Performed by: INTERNAL MEDICINE

## 2018-04-12 VITALS — DIASTOLIC BLOOD PRESSURE: 105 MMHG | SYSTOLIC BLOOD PRESSURE: 147 MMHG

## 2018-04-12 VITALS — DIASTOLIC BLOOD PRESSURE: 101 MMHG | SYSTOLIC BLOOD PRESSURE: 155 MMHG

## 2018-04-12 VITALS — SYSTOLIC BLOOD PRESSURE: 155 MMHG | DIASTOLIC BLOOD PRESSURE: 94 MMHG

## 2018-04-12 VITALS — SYSTOLIC BLOOD PRESSURE: 159 MMHG | DIASTOLIC BLOOD PRESSURE: 104 MMHG

## 2018-04-12 VITALS — DIASTOLIC BLOOD PRESSURE: 96 MMHG | SYSTOLIC BLOOD PRESSURE: 142 MMHG

## 2018-04-12 VITALS — SYSTOLIC BLOOD PRESSURE: 122 MMHG | DIASTOLIC BLOOD PRESSURE: 91 MMHG

## 2018-04-12 VITALS — DIASTOLIC BLOOD PRESSURE: 78 MMHG | SYSTOLIC BLOOD PRESSURE: 112 MMHG

## 2018-04-12 VITALS — DIASTOLIC BLOOD PRESSURE: 82 MMHG | SYSTOLIC BLOOD PRESSURE: 115 MMHG

## 2018-04-12 VITALS — DIASTOLIC BLOOD PRESSURE: 107 MMHG | SYSTOLIC BLOOD PRESSURE: 146 MMHG

## 2018-04-12 VITALS — DIASTOLIC BLOOD PRESSURE: 81 MMHG | SYSTOLIC BLOOD PRESSURE: 128 MMHG

## 2018-04-12 VITALS — SYSTOLIC BLOOD PRESSURE: 158 MMHG | DIASTOLIC BLOOD PRESSURE: 108 MMHG

## 2018-04-12 VITALS — SYSTOLIC BLOOD PRESSURE: 143 MMHG | DIASTOLIC BLOOD PRESSURE: 89 MMHG

## 2018-04-12 VITALS — SYSTOLIC BLOOD PRESSURE: 157 MMHG | DIASTOLIC BLOOD PRESSURE: 94 MMHG

## 2018-04-12 VITALS — DIASTOLIC BLOOD PRESSURE: 80 MMHG | SYSTOLIC BLOOD PRESSURE: 135 MMHG

## 2018-04-12 VITALS — SYSTOLIC BLOOD PRESSURE: 108 MMHG | DIASTOLIC BLOOD PRESSURE: 59 MMHG

## 2018-04-12 VITALS — SYSTOLIC BLOOD PRESSURE: 131 MMHG | DIASTOLIC BLOOD PRESSURE: 79 MMHG

## 2018-04-12 VITALS — DIASTOLIC BLOOD PRESSURE: 93 MMHG | SYSTOLIC BLOOD PRESSURE: 145 MMHG

## 2018-04-12 VITALS — SYSTOLIC BLOOD PRESSURE: 148 MMHG | DIASTOLIC BLOOD PRESSURE: 81 MMHG

## 2018-04-12 VITALS — SYSTOLIC BLOOD PRESSURE: 156 MMHG | DIASTOLIC BLOOD PRESSURE: 93 MMHG

## 2018-04-12 VITALS — SYSTOLIC BLOOD PRESSURE: 142 MMHG | DIASTOLIC BLOOD PRESSURE: 81 MMHG

## 2018-04-12 VITALS — DIASTOLIC BLOOD PRESSURE: 119 MMHG | SYSTOLIC BLOOD PRESSURE: 151 MMHG

## 2018-04-12 VITALS — SYSTOLIC BLOOD PRESSURE: 159 MMHG | DIASTOLIC BLOOD PRESSURE: 99 MMHG

## 2018-04-12 VITALS — SYSTOLIC BLOOD PRESSURE: 144 MMHG | DIASTOLIC BLOOD PRESSURE: 92 MMHG

## 2018-04-12 VITALS — SYSTOLIC BLOOD PRESSURE: 148 MMHG | DIASTOLIC BLOOD PRESSURE: 90 MMHG

## 2018-04-12 LAB
ALBUMIN SERPL-MCNC: 2.9 G/DL (ref 3.4–5)
ALP SERPL-CCNC: 19 U/L (ref 46–116)
ALT SERPL-CCNC: 17 U/L (ref 10–68)
ANION GAP SERPL CALC-SCNC: 12.2 MMOL/L (ref 8–16)
BASOPHILS NFR BLD AUTO: 0 % (ref 0–2)
BILIRUB SERPL-MCNC: 1.18 MG/DL (ref 0.2–1.3)
BUN SERPL-MCNC: 43 MG/DL (ref 7–18)
CALCIUM SERPL-MCNC: 9 MG/DL (ref 8.5–10.1)
CHLORIDE SERPL-SCNC: 99 MMOL/L (ref 98–107)
CO2 SERPL-SCNC: 34.6 MMOL/L (ref 21–32)
CREAT SERPL-MCNC: 1.7 MG/DL (ref 0.6–1.3)
EOSINOPHIL NFR BLD: 0 % (ref 0–7)
ERYTHROCYTE [DISTWIDTH] IN BLOOD BY AUTOMATED COUNT: 14.8 % (ref 11.5–14.5)
GLOBULIN SER-MCNC: 3.6 G/L
GLUCOSE SERPL-MCNC: 187 MG/DL (ref 74–106)
HCT VFR BLD CALC: 45.5 % (ref 42–54)
HGB BLD-MCNC: 14.4 G/DL (ref 13.5–17.5)
IMM GRANULOCYTES NFR BLD: 0.1 % (ref 0–5)
LYMPHOCYTES NFR BLD AUTO: 6.6 % (ref 15–50)
MAGNESIUM SERPL-MCNC: 1.7 MG/DL (ref 1.8–2.4)
MCH RBC QN AUTO: 32.6 PG (ref 26–34)
MCHC RBC AUTO-ENTMCNC: 31.6 G/DL (ref 31–37)
MCV RBC: 102.9 FL (ref 80–100)
MONOCYTES NFR BLD: 5.1 % (ref 2–11)
NEUTROPHILS NFR BLD AUTO: 88.2 % (ref 40–80)
OSMOLALITY SERPL CALC.SUM OF ELEC: 296 MOSM/KG (ref 275–300)
PHOSPHATE SERPL-MCNC: 3.3 MG/DL (ref 2.5–4.9)
PLATELET # BLD: 164 10X3/UL (ref 130–400)
PMV BLD AUTO: 11.5 FL (ref 7.4–10.4)
POTASSIUM SERPL-SCNC: 4.8 MMOL/L (ref 3.5–5.1)
PROT SERPL-MCNC: 6.5 G/DL (ref 6.4–8.2)
RBC # BLD AUTO: 4.42 10X6/UL (ref 4.2–6.1)
SODIUM SERPL-SCNC: 141 MMOL/L (ref 136–145)
WBC # BLD AUTO: 9 10X3/UL (ref 4.8–10.8)

## 2018-04-12 PROCEDURE — 5A09557 ASSISTANCE WITH RESPIRATORY VENTILATION, GREATER THAN 96 CONSECUTIVE HOURS, CONTINUOUS POSITIVE AIRWAY PRESSURE: ICD-10-PCS | Performed by: FAMILY MEDICINE

## 2018-04-13 VITALS — DIASTOLIC BLOOD PRESSURE: 98 MMHG | SYSTOLIC BLOOD PRESSURE: 146 MMHG

## 2018-04-13 VITALS — DIASTOLIC BLOOD PRESSURE: 104 MMHG | SYSTOLIC BLOOD PRESSURE: 152 MMHG

## 2018-04-13 VITALS — SYSTOLIC BLOOD PRESSURE: 127 MMHG | DIASTOLIC BLOOD PRESSURE: 86 MMHG

## 2018-04-13 VITALS — SYSTOLIC BLOOD PRESSURE: 113 MMHG | DIASTOLIC BLOOD PRESSURE: 79 MMHG

## 2018-04-13 VITALS — DIASTOLIC BLOOD PRESSURE: 105 MMHG | SYSTOLIC BLOOD PRESSURE: 114 MMHG

## 2018-04-13 VITALS — DIASTOLIC BLOOD PRESSURE: 84 MMHG | SYSTOLIC BLOOD PRESSURE: 136 MMHG

## 2018-04-13 VITALS — SYSTOLIC BLOOD PRESSURE: 127 MMHG | DIASTOLIC BLOOD PRESSURE: 84 MMHG

## 2018-04-13 VITALS — DIASTOLIC BLOOD PRESSURE: 77 MMHG | SYSTOLIC BLOOD PRESSURE: 128 MMHG

## 2018-04-13 VITALS — DIASTOLIC BLOOD PRESSURE: 108 MMHG | SYSTOLIC BLOOD PRESSURE: 156 MMHG

## 2018-04-13 VITALS — DIASTOLIC BLOOD PRESSURE: 110 MMHG | SYSTOLIC BLOOD PRESSURE: 166 MMHG

## 2018-04-13 VITALS — SYSTOLIC BLOOD PRESSURE: 119 MMHG | DIASTOLIC BLOOD PRESSURE: 81 MMHG

## 2018-04-13 VITALS — DIASTOLIC BLOOD PRESSURE: 97 MMHG | SYSTOLIC BLOOD PRESSURE: 138 MMHG

## 2018-04-13 VITALS — DIASTOLIC BLOOD PRESSURE: 108 MMHG | SYSTOLIC BLOOD PRESSURE: 158 MMHG

## 2018-04-13 VITALS — DIASTOLIC BLOOD PRESSURE: 99 MMHG | SYSTOLIC BLOOD PRESSURE: 160 MMHG

## 2018-04-13 VITALS — SYSTOLIC BLOOD PRESSURE: 135 MMHG | DIASTOLIC BLOOD PRESSURE: 90 MMHG

## 2018-04-13 VITALS — DIASTOLIC BLOOD PRESSURE: 109 MMHG | SYSTOLIC BLOOD PRESSURE: 164 MMHG

## 2018-04-13 VITALS — DIASTOLIC BLOOD PRESSURE: 87 MMHG | SYSTOLIC BLOOD PRESSURE: 127 MMHG

## 2018-04-13 VITALS — DIASTOLIC BLOOD PRESSURE: 87 MMHG | SYSTOLIC BLOOD PRESSURE: 135 MMHG

## 2018-04-13 VITALS — DIASTOLIC BLOOD PRESSURE: 107 MMHG | SYSTOLIC BLOOD PRESSURE: 159 MMHG

## 2018-04-13 VITALS — DIASTOLIC BLOOD PRESSURE: 110 MMHG | SYSTOLIC BLOOD PRESSURE: 157 MMHG

## 2018-04-13 VITALS — DIASTOLIC BLOOD PRESSURE: 90 MMHG | SYSTOLIC BLOOD PRESSURE: 130 MMHG

## 2018-04-13 VITALS — DIASTOLIC BLOOD PRESSURE: 94 MMHG | SYSTOLIC BLOOD PRESSURE: 136 MMHG

## 2018-04-13 LAB
ALBUMIN SERPL-MCNC: 2.9 G/DL (ref 3.4–5)
ALP SERPL-CCNC: 40 U/L (ref 46–116)
ALT SERPL-CCNC: 19 U/L (ref 10–68)
ANION GAP SERPL CALC-SCNC: 5.9 MMOL/L (ref 8–16)
BASOPHILS NFR BLD AUTO: 0 % (ref 0–2)
BILIRUB SERPL-MCNC: 0.7 MG/DL (ref 0.2–1.3)
BUN SERPL-MCNC: 50 MG/DL (ref 7–18)
CALCIUM SERPL-MCNC: 8.9 MG/DL (ref 8.5–10.1)
CHLORIDE SERPL-SCNC: 99 MMOL/L (ref 98–107)
CO2 SERPL-SCNC: 39.3 MMOL/L (ref 21–32)
CREAT SERPL-MCNC: 1.7 MG/DL (ref 0.6–1.3)
EOSINOPHIL NFR BLD: 0 % (ref 0–7)
ERYTHROCYTE [DISTWIDTH] IN BLOOD BY AUTOMATED COUNT: 14.5 % (ref 11.5–14.5)
GLOBULIN SER-MCNC: 3.5 G/L
GLUCOSE SERPL-MCNC: 287 MG/DL (ref 74–106)
HCT VFR BLD CALC: 45.4 % (ref 42–54)
HGB BLD-MCNC: 14.1 G/DL (ref 13.5–17.5)
IMM GRANULOCYTES NFR BLD: 0.3 % (ref 0–5)
LYMPHOCYTES NFR BLD AUTO: 5.8 % (ref 15–50)
MAGNESIUM SERPL-MCNC: 1.8 MG/DL (ref 1.8–2.4)
MCH RBC QN AUTO: 31.8 PG (ref 26–34)
MCHC RBC AUTO-ENTMCNC: 31.1 G/DL (ref 31–37)
MCV RBC: 102.3 FL (ref 80–100)
MONOCYTES NFR BLD: 4.6 % (ref 2–11)
NEUTROPHILS NFR BLD AUTO: 89.3 % (ref 40–80)
OSMOLALITY SERPL CALC.SUM OF ELEC: 301 MOSM/KG (ref 275–300)
PHOSPHATE SERPL-MCNC: 2.8 MG/DL (ref 2.5–4.9)
PLATELET # BLD: 158 10X3/UL (ref 130–400)
PMV BLD AUTO: 11.1 FL (ref 7.4–10.4)
POTASSIUM SERPL-SCNC: 4.2 MMOL/L (ref 3.5–5.1)
PROT SERPL-MCNC: 6.4 G/DL (ref 6.4–8.2)
RBC # BLD AUTO: 4.44 10X6/UL (ref 4.2–6.1)
SODIUM SERPL-SCNC: 140 MMOL/L (ref 136–145)
TROPONIN I SERPL-MCNC: 0.04 NG/ML (ref 0–0.06)
WBC # BLD AUTO: 7.8 10X3/UL (ref 4.8–10.8)

## 2018-04-14 VITALS — SYSTOLIC BLOOD PRESSURE: 134 MMHG | DIASTOLIC BLOOD PRESSURE: 103 MMHG

## 2018-04-14 VITALS — SYSTOLIC BLOOD PRESSURE: 132 MMHG | DIASTOLIC BLOOD PRESSURE: 89 MMHG

## 2018-04-14 VITALS — SYSTOLIC BLOOD PRESSURE: 156 MMHG | DIASTOLIC BLOOD PRESSURE: 93 MMHG

## 2018-04-14 VITALS — DIASTOLIC BLOOD PRESSURE: 82 MMHG | SYSTOLIC BLOOD PRESSURE: 137 MMHG

## 2018-04-14 VITALS — SYSTOLIC BLOOD PRESSURE: 138 MMHG | DIASTOLIC BLOOD PRESSURE: 83 MMHG

## 2018-04-14 VITALS — DIASTOLIC BLOOD PRESSURE: 79 MMHG | SYSTOLIC BLOOD PRESSURE: 134 MMHG

## 2018-04-14 VITALS — SYSTOLIC BLOOD PRESSURE: 145 MMHG | DIASTOLIC BLOOD PRESSURE: 91 MMHG

## 2018-04-14 VITALS — SYSTOLIC BLOOD PRESSURE: 130 MMHG | DIASTOLIC BLOOD PRESSURE: 79 MMHG

## 2018-04-14 VITALS — DIASTOLIC BLOOD PRESSURE: 91 MMHG | SYSTOLIC BLOOD PRESSURE: 135 MMHG

## 2018-04-14 VITALS — SYSTOLIC BLOOD PRESSURE: 97 MMHG | DIASTOLIC BLOOD PRESSURE: 72 MMHG

## 2018-04-14 VITALS — DIASTOLIC BLOOD PRESSURE: 91 MMHG | SYSTOLIC BLOOD PRESSURE: 150 MMHG

## 2018-04-14 VITALS — SYSTOLIC BLOOD PRESSURE: 142 MMHG | DIASTOLIC BLOOD PRESSURE: 92 MMHG

## 2018-04-14 VITALS — SYSTOLIC BLOOD PRESSURE: 132 MMHG | DIASTOLIC BLOOD PRESSURE: 113 MMHG

## 2018-04-14 VITALS — SYSTOLIC BLOOD PRESSURE: 144 MMHG | DIASTOLIC BLOOD PRESSURE: 83 MMHG

## 2018-04-14 VITALS — SYSTOLIC BLOOD PRESSURE: 149 MMHG | DIASTOLIC BLOOD PRESSURE: 95 MMHG

## 2018-04-14 VITALS — DIASTOLIC BLOOD PRESSURE: 96 MMHG | SYSTOLIC BLOOD PRESSURE: 143 MMHG

## 2018-04-14 VITALS — DIASTOLIC BLOOD PRESSURE: 87 MMHG | SYSTOLIC BLOOD PRESSURE: 144 MMHG

## 2018-04-14 VITALS — SYSTOLIC BLOOD PRESSURE: 156 MMHG | DIASTOLIC BLOOD PRESSURE: 101 MMHG

## 2018-04-14 VITALS — DIASTOLIC BLOOD PRESSURE: 102 MMHG | SYSTOLIC BLOOD PRESSURE: 155 MMHG

## 2018-04-14 VITALS — DIASTOLIC BLOOD PRESSURE: 85 MMHG | SYSTOLIC BLOOD PRESSURE: 145 MMHG

## 2018-04-14 VITALS — DIASTOLIC BLOOD PRESSURE: 89 MMHG | SYSTOLIC BLOOD PRESSURE: 135 MMHG

## 2018-04-14 VITALS — SYSTOLIC BLOOD PRESSURE: 139 MMHG | DIASTOLIC BLOOD PRESSURE: 85 MMHG

## 2018-04-14 VITALS — SYSTOLIC BLOOD PRESSURE: 138 MMHG | DIASTOLIC BLOOD PRESSURE: 97 MMHG

## 2018-04-14 VITALS — SYSTOLIC BLOOD PRESSURE: 137 MMHG | DIASTOLIC BLOOD PRESSURE: 97 MMHG

## 2018-04-14 LAB
ANION GAP SERPL CALC-SCNC: 10.3 MMOL/L (ref 8–16)
BASOPHILS NFR BLD AUTO: 0 % (ref 0–2)
BUN SERPL-MCNC: 47 MG/DL (ref 7–18)
CALCIUM SERPL-MCNC: 9 MG/DL (ref 8.5–10.1)
CHLORIDE SERPL-SCNC: 99 MMOL/L (ref 98–107)
CO2 SERPL-SCNC: 37 MMOL/L (ref 21–32)
CREAT SERPL-MCNC: 1.4 MG/DL (ref 0.6–1.3)
EOSINOPHIL NFR BLD: 0 % (ref 0–7)
ERYTHROCYTE [DISTWIDTH] IN BLOOD BY AUTOMATED COUNT: 14.2 % (ref 11.5–14.5)
GLUCOSE SERPL-MCNC: 326 MG/DL (ref 74–106)
HCT VFR BLD CALC: 44.7 % (ref 42–54)
HGB BLD-MCNC: 14.1 G/DL (ref 13.5–17.5)
IMM GRANULOCYTES NFR BLD: 0.4 % (ref 0–5)
LYMPHOCYTES NFR BLD AUTO: 7.4 % (ref 15–50)
MCH RBC QN AUTO: 32.1 PG (ref 26–34)
MCHC RBC AUTO-ENTMCNC: 31.5 G/DL (ref 31–37)
MCV RBC: 101.8 FL (ref 80–100)
MONOCYTES NFR BLD: 4.7 % (ref 2–11)
NEUTROPHILS NFR BLD AUTO: 87.5 % (ref 40–80)
OSMOLALITY SERPL CALC.SUM OF ELEC: 307 MOSM/KG (ref 275–300)
PLATELET # BLD: 140 10X3/UL (ref 130–400)
PMV BLD AUTO: 11.1 FL (ref 7.4–10.4)
POTASSIUM SERPL-SCNC: 4.3 MMOL/L (ref 3.5–5.1)
RBC # BLD AUTO: 4.39 10X6/UL (ref 4.2–6.1)
SODIUM SERPL-SCNC: 142 MMOL/L (ref 136–145)
WBC # BLD AUTO: 7.3 10X3/UL (ref 4.8–10.8)

## 2018-04-15 VITALS — DIASTOLIC BLOOD PRESSURE: 92 MMHG | SYSTOLIC BLOOD PRESSURE: 151 MMHG

## 2018-04-15 VITALS — DIASTOLIC BLOOD PRESSURE: 87 MMHG | SYSTOLIC BLOOD PRESSURE: 141 MMHG

## 2018-04-15 VITALS — SYSTOLIC BLOOD PRESSURE: 152 MMHG | DIASTOLIC BLOOD PRESSURE: 90 MMHG

## 2018-04-15 VITALS — DIASTOLIC BLOOD PRESSURE: 94 MMHG | SYSTOLIC BLOOD PRESSURE: 133 MMHG

## 2018-04-15 VITALS — SYSTOLIC BLOOD PRESSURE: 140 MMHG | DIASTOLIC BLOOD PRESSURE: 101 MMHG

## 2018-04-15 VITALS — SYSTOLIC BLOOD PRESSURE: 142 MMHG | DIASTOLIC BLOOD PRESSURE: 90 MMHG

## 2018-04-15 VITALS — DIASTOLIC BLOOD PRESSURE: 79 MMHG | SYSTOLIC BLOOD PRESSURE: 118 MMHG

## 2018-04-15 VITALS — SYSTOLIC BLOOD PRESSURE: 134 MMHG | DIASTOLIC BLOOD PRESSURE: 86 MMHG

## 2018-04-15 VITALS — DIASTOLIC BLOOD PRESSURE: 90 MMHG | SYSTOLIC BLOOD PRESSURE: 138 MMHG

## 2018-04-15 VITALS — SYSTOLIC BLOOD PRESSURE: 126 MMHG | DIASTOLIC BLOOD PRESSURE: 77 MMHG

## 2018-04-15 VITALS — SYSTOLIC BLOOD PRESSURE: 140 MMHG | DIASTOLIC BLOOD PRESSURE: 82 MMHG

## 2018-04-15 VITALS — SYSTOLIC BLOOD PRESSURE: 139 MMHG | DIASTOLIC BLOOD PRESSURE: 99 MMHG

## 2018-04-15 VITALS — SYSTOLIC BLOOD PRESSURE: 145 MMHG | DIASTOLIC BLOOD PRESSURE: 97 MMHG

## 2018-04-15 VITALS — SYSTOLIC BLOOD PRESSURE: 153 MMHG | DIASTOLIC BLOOD PRESSURE: 91 MMHG

## 2018-04-15 VITALS — DIASTOLIC BLOOD PRESSURE: 104 MMHG | SYSTOLIC BLOOD PRESSURE: 149 MMHG

## 2018-04-15 VITALS — DIASTOLIC BLOOD PRESSURE: 96 MMHG | SYSTOLIC BLOOD PRESSURE: 145 MMHG

## 2018-04-15 VITALS — SYSTOLIC BLOOD PRESSURE: 129 MMHG | DIASTOLIC BLOOD PRESSURE: 84 MMHG

## 2018-04-15 VITALS — SYSTOLIC BLOOD PRESSURE: 154 MMHG | DIASTOLIC BLOOD PRESSURE: 107 MMHG

## 2018-04-15 VITALS — SYSTOLIC BLOOD PRESSURE: 138 MMHG | DIASTOLIC BLOOD PRESSURE: 89 MMHG

## 2018-04-15 VITALS — SYSTOLIC BLOOD PRESSURE: 131 MMHG | DIASTOLIC BLOOD PRESSURE: 98 MMHG

## 2018-04-15 LAB
ANION GAP SERPL CALC-SCNC: 7.5 MMOL/L (ref 8–16)
BASOPHILS NFR BLD AUTO: 0.1 % (ref 0–2)
BUN SERPL-MCNC: 49 MG/DL (ref 7–18)
CALCIUM SERPL-MCNC: 8.9 MG/DL (ref 8.5–10.1)
CHLORIDE SERPL-SCNC: 100 MMOL/L (ref 98–107)
CHOLEST/HDLC SERPL: 9.8 RATIO (ref 2.3–4.9)
CO2 SERPL-SCNC: 39.8 MMOL/L (ref 21–32)
CREAT SERPL-MCNC: 1.3 MG/DL (ref 0.6–1.3)
EOSINOPHIL NFR BLD: 0 % (ref 0–7)
ERYTHROCYTE [DISTWIDTH] IN BLOOD BY AUTOMATED COUNT: 14.1 % (ref 11.5–14.5)
EST. AVERAGE GLUCOSE BLD GHB EST-MCNC: 177 MG/DL (ref 74–154)
GLUCOSE SERPL-MCNC: 243 MG/DL (ref 74–106)
HCT VFR BLD CALC: 45 % (ref 42–54)
HDLC SERPL-MCNC: 18 MG/DL (ref 32–96)
HGB BLD-MCNC: 14.3 G/DL (ref 13.5–17.5)
IMM GRANULOCYTES NFR BLD: 0.2 % (ref 0–5)
LDL-HDL RATIO: 5.9 RATIO (ref 1.5–3.5)
LDLC SERPL-MCNC: 106 MG/DL (ref 0–100)
LYMPHOCYTES NFR BLD AUTO: 5.1 % (ref 15–50)
MAGNESIUM SERPL-MCNC: 1.8 MG/DL (ref 1.8–2.4)
MCH RBC QN AUTO: 32.4 PG (ref 26–34)
MCHC RBC AUTO-ENTMCNC: 31.8 G/DL (ref 31–37)
MCV RBC: 101.8 FL (ref 80–100)
MONOCYTES NFR BLD: 3.9 % (ref 2–11)
NEUTROPHILS NFR BLD AUTO: 90.7 % (ref 40–80)
OSMOLALITY SERPL CALC.SUM OF ELEC: 303 MOSM/KG (ref 275–300)
PHOSPHATE SERPL-MCNC: 2.9 MG/DL (ref 2.5–4.9)
PLATELET # BLD: 158 10X3/UL (ref 130–400)
PMV BLD AUTO: 11.2 FL (ref 7.4–10.4)
POTASSIUM SERPL-SCNC: 5.3 MMOL/L (ref 3.5–5.1)
RBC # BLD AUTO: 4.42 10X6/UL (ref 4.2–6.1)
SODIUM SERPL-SCNC: 142 MMOL/L (ref 136–145)
TRIGL SERPL-MCNC: 263 MG/DL (ref 30–200)
WBC # BLD AUTO: 9.6 10X3/UL (ref 4.8–10.8)

## 2018-04-16 VITALS — DIASTOLIC BLOOD PRESSURE: 86 MMHG | SYSTOLIC BLOOD PRESSURE: 145 MMHG

## 2018-04-16 VITALS — DIASTOLIC BLOOD PRESSURE: 54 MMHG | SYSTOLIC BLOOD PRESSURE: 116 MMHG

## 2018-04-16 VITALS — SYSTOLIC BLOOD PRESSURE: 143 MMHG | DIASTOLIC BLOOD PRESSURE: 95 MMHG

## 2018-04-16 VITALS — DIASTOLIC BLOOD PRESSURE: 95 MMHG | SYSTOLIC BLOOD PRESSURE: 144 MMHG

## 2018-04-16 VITALS — DIASTOLIC BLOOD PRESSURE: 99 MMHG | SYSTOLIC BLOOD PRESSURE: 155 MMHG

## 2018-04-16 VITALS — DIASTOLIC BLOOD PRESSURE: 96 MMHG | SYSTOLIC BLOOD PRESSURE: 138 MMHG

## 2018-04-16 VITALS — SYSTOLIC BLOOD PRESSURE: 150 MMHG | DIASTOLIC BLOOD PRESSURE: 74 MMHG

## 2018-04-16 VITALS — DIASTOLIC BLOOD PRESSURE: 103 MMHG | SYSTOLIC BLOOD PRESSURE: 144 MMHG

## 2018-04-16 VITALS — DIASTOLIC BLOOD PRESSURE: 81 MMHG | SYSTOLIC BLOOD PRESSURE: 133 MMHG

## 2018-04-16 VITALS — DIASTOLIC BLOOD PRESSURE: 62 MMHG | SYSTOLIC BLOOD PRESSURE: 144 MMHG

## 2018-04-16 VITALS — DIASTOLIC BLOOD PRESSURE: 83 MMHG | SYSTOLIC BLOOD PRESSURE: 129 MMHG

## 2018-04-16 VITALS — DIASTOLIC BLOOD PRESSURE: 84 MMHG | SYSTOLIC BLOOD PRESSURE: 155 MMHG

## 2018-04-16 VITALS — DIASTOLIC BLOOD PRESSURE: 92 MMHG | SYSTOLIC BLOOD PRESSURE: 131 MMHG

## 2018-04-16 VITALS — DIASTOLIC BLOOD PRESSURE: 89 MMHG | SYSTOLIC BLOOD PRESSURE: 143 MMHG

## 2018-04-16 VITALS — DIASTOLIC BLOOD PRESSURE: 80 MMHG | SYSTOLIC BLOOD PRESSURE: 138 MMHG

## 2018-04-16 VITALS — SYSTOLIC BLOOD PRESSURE: 131 MMHG | DIASTOLIC BLOOD PRESSURE: 56 MMHG

## 2018-04-16 VITALS — DIASTOLIC BLOOD PRESSURE: 84 MMHG | SYSTOLIC BLOOD PRESSURE: 126 MMHG

## 2018-04-16 VITALS — SYSTOLIC BLOOD PRESSURE: 154 MMHG | DIASTOLIC BLOOD PRESSURE: 105 MMHG

## 2018-04-16 LAB
ANION GAP SERPL CALC-SCNC: 6.2 MMOL/L (ref 8–16)
BASOPHILS NFR BLD AUTO: 0 % (ref 0–2)
BUN SERPL-MCNC: 51 MG/DL (ref 7–18)
CALCIUM SERPL-MCNC: 9 MG/DL (ref 8.5–10.1)
CHLORIDE SERPL-SCNC: 102 MMOL/L (ref 98–107)
CO2 SERPL-SCNC: 40.8 MMOL/L (ref 21–32)
CREAT SERPL-MCNC: 1.3 MG/DL (ref 0.6–1.3)
EOSINOPHIL NFR BLD: 0 % (ref 0–7)
ERYTHROCYTE [DISTWIDTH] IN BLOOD BY AUTOMATED COUNT: 14.3 % (ref 11.5–14.5)
GLUCOSE SERPL-MCNC: 210 MG/DL (ref 74–106)
HCT VFR BLD CALC: 45.9 % (ref 42–54)
HGB BLD-MCNC: 14.3 G/DL (ref 13.5–17.5)
IMM GRANULOCYTES NFR BLD: 0.4 % (ref 0–5)
LYMPHOCYTES NFR BLD AUTO: 4.2 % (ref 15–50)
MAGNESIUM SERPL-MCNC: 1.9 MG/DL (ref 1.8–2.4)
MCH RBC QN AUTO: 32 PG (ref 26–34)
MCHC RBC AUTO-ENTMCNC: 31.2 G/DL (ref 31–37)
MCV RBC: 102.7 FL (ref 80–100)
MONOCYTES NFR BLD: 3.3 % (ref 2–11)
NEUTROPHILS NFR BLD AUTO: 92.1 % (ref 40–80)
OSMOLALITY SERPL CALC.SUM OF ELEC: 308 MOSM/KG (ref 275–300)
PHOSPHATE SERPL-MCNC: 3.2 MG/DL (ref 2.5–4.9)
PLATELET # BLD: 159 10X3/UL (ref 130–400)
PMV BLD AUTO: 11.4 FL (ref 7.4–10.4)
POTASSIUM SERPL-SCNC: 4 MMOL/L (ref 3.5–5.1)
RBC # BLD AUTO: 4.47 10X6/UL (ref 4.2–6.1)
SODIUM SERPL-SCNC: 145 MMOL/L (ref 136–145)
WBC # BLD AUTO: 11.1 10X3/UL (ref 4.8–10.8)

## 2018-04-17 VITALS — SYSTOLIC BLOOD PRESSURE: 131 MMHG | DIASTOLIC BLOOD PRESSURE: 69 MMHG

## 2018-04-17 VITALS — SYSTOLIC BLOOD PRESSURE: 140 MMHG | DIASTOLIC BLOOD PRESSURE: 89 MMHG

## 2018-04-17 VITALS — DIASTOLIC BLOOD PRESSURE: 86 MMHG | SYSTOLIC BLOOD PRESSURE: 132 MMHG

## 2018-04-17 VITALS — SYSTOLIC BLOOD PRESSURE: 143 MMHG | DIASTOLIC BLOOD PRESSURE: 93 MMHG

## 2018-04-17 VITALS — SYSTOLIC BLOOD PRESSURE: 136 MMHG | DIASTOLIC BLOOD PRESSURE: 82 MMHG

## 2018-04-17 VITALS — SYSTOLIC BLOOD PRESSURE: 138 MMHG | DIASTOLIC BLOOD PRESSURE: 72 MMHG

## 2018-04-17 LAB
ALBUMIN SERPL-MCNC: 2.8 G/DL (ref 3.4–5)
ALP SERPL-CCNC: 25 U/L (ref 46–116)
ALT SERPL-CCNC: 20 U/L (ref 10–68)
ANION GAP SERPL CALC-SCNC: 1.1 MMOL/L (ref 8–16)
BASOPHILS NFR BLD AUTO: 0.1 % (ref 0–2)
BILIRUB SERPL-MCNC: 1.1 MG/DL (ref 0.2–1.3)
BUN SERPL-MCNC: 53 MG/DL (ref 7–18)
CALCIUM SERPL-MCNC: 9.5 MG/DL (ref 8.5–10.1)
CHLORIDE SERPL-SCNC: 105 MMOL/L (ref 98–107)
CO2 SERPL-SCNC: 44.3 MMOL/L (ref 21–32)
CREAT SERPL-MCNC: 1.4 MG/DL (ref 0.6–1.3)
EOSINOPHIL NFR BLD: 0.1 % (ref 0–7)
ERYTHROCYTE [DISTWIDTH] IN BLOOD BY AUTOMATED COUNT: 14.1 % (ref 11.5–14.5)
GLOBULIN SER-MCNC: 3.2 G/L
GLUCOSE SERPL-MCNC: 95 MG/DL (ref 74–106)
HCT VFR BLD CALC: 45.1 % (ref 42–54)
HGB BLD-MCNC: 14 G/DL (ref 13.5–17.5)
IMM GRANULOCYTES NFR BLD: 0.5 % (ref 0–5)
LYMPHOCYTES NFR BLD AUTO: 4.8 % (ref 15–50)
MCH RBC QN AUTO: 32 PG (ref 26–34)
MCHC RBC AUTO-ENTMCNC: 31 G/DL (ref 31–37)
MCV RBC: 103 FL (ref 80–100)
MONOCYTES NFR BLD: 3.6 % (ref 2–11)
NEUTROPHILS NFR BLD AUTO: 90.9 % (ref 40–80)
OSMOLALITY SERPL CALC.SUM OF ELEC: 304 MOSM/KG (ref 275–300)
PLATELET # BLD: 170 10X3/UL (ref 130–400)
PMV BLD AUTO: 11.3 FL (ref 7.4–10.4)
POTASSIUM SERPL-SCNC: 4.4 MMOL/L (ref 3.5–5.1)
PROT SERPL-MCNC: 6 G/DL (ref 6.4–8.2)
RBC # BLD AUTO: 4.38 10X6/UL (ref 4.2–6.1)
SODIUM SERPL-SCNC: 146 MMOL/L (ref 136–145)
WBC # BLD AUTO: 12.9 10X3/UL (ref 4.8–10.8)

## 2018-04-18 VITALS — DIASTOLIC BLOOD PRESSURE: 67 MMHG | SYSTOLIC BLOOD PRESSURE: 132 MMHG

## 2018-04-18 VITALS — DIASTOLIC BLOOD PRESSURE: 85 MMHG | SYSTOLIC BLOOD PRESSURE: 102 MMHG

## 2018-04-18 VITALS — SYSTOLIC BLOOD PRESSURE: 127 MMHG | DIASTOLIC BLOOD PRESSURE: 71 MMHG

## 2018-04-18 VITALS — SYSTOLIC BLOOD PRESSURE: 140 MMHG | DIASTOLIC BLOOD PRESSURE: 80 MMHG

## 2018-04-18 VITALS — SYSTOLIC BLOOD PRESSURE: 129 MMHG | DIASTOLIC BLOOD PRESSURE: 76 MMHG

## 2018-04-18 VITALS — SYSTOLIC BLOOD PRESSURE: 136 MMHG | DIASTOLIC BLOOD PRESSURE: 77 MMHG

## 2018-04-19 VITALS — DIASTOLIC BLOOD PRESSURE: 72 MMHG | SYSTOLIC BLOOD PRESSURE: 135 MMHG

## 2018-04-19 VITALS — SYSTOLIC BLOOD PRESSURE: 134 MMHG | DIASTOLIC BLOOD PRESSURE: 86 MMHG

## 2018-04-19 VITALS — DIASTOLIC BLOOD PRESSURE: 69 MMHG | SYSTOLIC BLOOD PRESSURE: 128 MMHG

## 2018-04-19 VITALS — SYSTOLIC BLOOD PRESSURE: 126 MMHG | DIASTOLIC BLOOD PRESSURE: 70 MMHG

## 2018-04-19 LAB
BASOPHILS NFR BLD AUTO: 0 % (ref 0–2)
EOSINOPHIL NFR BLD: 0 % (ref 0–7)
ERYTHROCYTE [DISTWIDTH] IN BLOOD BY AUTOMATED COUNT: 14 % (ref 11.5–14.5)
HCT VFR BLD CALC: 45.2 % (ref 42–54)
HGB BLD-MCNC: 14.1 G/DL (ref 13.5–17.5)
IMM GRANULOCYTES NFR BLD: 0.5 % (ref 0–5)
LYMPHOCYTES NFR BLD AUTO: 4.4 % (ref 15–50)
MAGNESIUM SERPL-MCNC: 1.7 MG/DL (ref 1.8–2.4)
MCH RBC QN AUTO: 31.8 PG (ref 26–34)
MCHC RBC AUTO-ENTMCNC: 31.2 G/DL (ref 31–37)
MCV RBC: 101.8 FL (ref 80–100)
MONOCYTES NFR BLD: 2.7 % (ref 2–11)
NEUTROPHILS NFR BLD AUTO: 92.4 % (ref 40–80)
PHOSPHATE SERPL-MCNC: 3.5 MG/DL (ref 2.5–4.9)
PLATELET # BLD: 165 10X3/UL (ref 130–400)
PMV BLD AUTO: 11.5 FL (ref 7.4–10.4)
RBC # BLD AUTO: 4.44 10X6/UL (ref 4.2–6.1)
WBC # BLD AUTO: 15.1 10X3/UL (ref 4.8–10.8)

## 2018-04-20 VITALS — DIASTOLIC BLOOD PRESSURE: 79 MMHG | SYSTOLIC BLOOD PRESSURE: 126 MMHG

## 2018-04-20 VITALS — DIASTOLIC BLOOD PRESSURE: 69 MMHG | SYSTOLIC BLOOD PRESSURE: 106 MMHG

## 2018-04-20 VITALS — SYSTOLIC BLOOD PRESSURE: 120 MMHG | DIASTOLIC BLOOD PRESSURE: 77 MMHG

## 2018-04-20 LAB
ALBUMIN SERPL-MCNC: 2.8 G/DL (ref 3.4–5)
ALP SERPL-CCNC: 37 U/L (ref 46–116)
ALT SERPL-CCNC: 21 U/L (ref 10–68)
ANION GAP SERPL CALC-SCNC: 7.2 MMOL/L (ref 8–16)
BILIRUB SERPL-MCNC: 0.8 MG/DL (ref 0.2–1.3)
BUN SERPL-MCNC: 48 MG/DL (ref 7–18)
CALCIUM SERPL-MCNC: 9 MG/DL (ref 8.5–10.1)
CHLORIDE SERPL-SCNC: 101 MMOL/L (ref 98–107)
CO2 SERPL-SCNC: 37.4 MMOL/L (ref 21–32)
CREAT SERPL-MCNC: 1.2 MG/DL (ref 0.6–1.3)
GLOBULIN SER-MCNC: 2.9 G/L
GLUCOSE SERPL-MCNC: 177 MG/DL (ref 74–106)
OSMOLALITY SERPL CALC.SUM OF ELEC: 297 MOSM/KG (ref 275–300)
POTASSIUM SERPL-SCNC: 4.6 MMOL/L (ref 3.5–5.1)
PROT SERPL-MCNC: 5.7 G/DL (ref 6.4–8.2)
SODIUM SERPL-SCNC: 141 MMOL/L (ref 136–145)

## 2018-05-02 ENCOUNTER — HOSPITAL ENCOUNTER (OUTPATIENT)
Dept: HOSPITAL 84 - D.LAB | Age: 78
Discharge: HOME | End: 2018-05-02
Attending: FAMILY MEDICINE
Payer: MEDICARE

## 2018-05-02 VITALS — BODY MASS INDEX: 31.6 KG/M2
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
## 2018-05-02 DIAGNOSIS — J44.9: ICD-10-CM
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
## 2018-05-02 DIAGNOSIS — T14.8XXA: ICD-10-CM

## 2018-05-02 DIAGNOSIS — R60.9: Primary | ICD-10-CM

## 2018-05-02 LAB
ALBUMIN SERPL-MCNC: 2.5 G/DL (ref 3.4–5)
ALP SERPL-CCNC: 36 U/L (ref 46–116)
ALT SERPL-CCNC: 35 U/L (ref 10–68)
ANION GAP SERPL CALC-SCNC: 12.4 MMOL/L (ref 8–16)
BASOPHILS NFR BLD AUTO: 0.1 % (ref 0–2)
BILIRUB SERPL-MCNC: 0.56 MG/DL (ref 0.2–1.3)
BUN SERPL-MCNC: 51 MG/DL (ref 7–18)
CALCIUM SERPL-MCNC: 9.5 MG/DL (ref 8.5–10.1)
CHLORIDE SERPL-SCNC: 99 MMOL/L (ref 98–107)
CO2 SERPL-SCNC: 33.6 MMOL/L (ref 21–32)
CREAT SERPL-MCNC: 2.1 MG/DL (ref 0.6–1.3)
EOSINOPHIL NFR BLD: 1.5 % (ref 0–7)
ERYTHROCYTE [DISTWIDTH] IN BLOOD BY AUTOMATED COUNT: 14.8 % (ref 11.5–14.5)
GLOBULIN SER-MCNC: 4.3 G/L
GLUCOSE SERPL-MCNC: 139 MG/DL (ref 74–106)
HCT VFR BLD CALC: 41.3 % (ref 42–54)
HGB BLD-MCNC: 13.1 G/DL (ref 13.5–17.5)
IMM GRANULOCYTES NFR BLD: 0.1 % (ref 0–5)
LYMPHOCYTES NFR BLD AUTO: 18.5 % (ref 15–50)
MAGNESIUM SERPL-MCNC: 1.8 MG/DL (ref 1.8–2.4)
MCH RBC QN AUTO: 31.9 PG (ref 26–34)
MCHC RBC AUTO-ENTMCNC: 31.7 G/DL (ref 31–37)
MCV RBC: 100.5 FL (ref 80–100)
MONOCYTES NFR BLD: 6.6 % (ref 2–11)
NEUTROPHILS NFR BLD AUTO: 73.2 % (ref 40–80)
OSMOLALITY SERPL CALC.SUM OF ELEC: 296 MOSM/KG (ref 275–300)
PHOSPHATE SERPL-MCNC: 3.8 MG/DL (ref 2.5–4.9)
PLATELET # BLD: 192 10X3/UL (ref 130–400)
PMV BLD AUTO: 10.4 FL (ref 7.4–10.4)
POTASSIUM SERPL-SCNC: 4 MMOL/L (ref 3.5–5.1)
PROT SERPL-MCNC: 6.8 G/DL (ref 6.4–8.2)
RBC # BLD AUTO: 4.11 10X6/UL (ref 4.2–6.1)
SODIUM SERPL-SCNC: 141 MMOL/L (ref 136–145)
WBC # BLD AUTO: 7.6 10X3/UL (ref 4.8–10.8)

## 2021-03-19 NOTE — OP
2nd call called pt - LM   PATIENT NAME:  ANGELICA MCGREGOR                              MEDICAL RECORD: [id number]
:42                                             LOCATION:D.M2     D.2136
                                                         ADMISSION DATE:17
SURGEON:  FAHAD ARREAGA M.D.          
 
 
DATE OF OPERATION:  2017
 
REFERRING PHYSICIAN:  Porter Ann MD.
 
PROCEDURES PERFORMED:
1.  Selective coronary angiography.
2.  Left heart catheterization with ventriculogram.
 
INDICATION:  A 77-year-old gentleman presents with symptoms of dyspnea worsened
for angina.  Cardiac catheterization was performed to rule out restenosis.
 
EQUIPMENT USED:  A 5-[place] JL4, Reymundo right, pigtail catheter.
 
TECHNIQUE:  A 5-[place] sheath was inserted in retrograde fashion in the right
common femoral artery.  Next, selective coronary angiography was performed in
standard views using 5-[place] JL4 and Reymundo right.  Left heart
catheterization was performed using pigtail catheter.
 
CORONARY ANATOMY:
1.  Left main:  Left main trunk is moderate in caliber.  It gives rise to the
LAD and circumflex.  There is no obstruction.
2.  LAD:  This is a moderate-caliber vessel extending to the apex.  The proximal
mid vessel has been stented.  The stents are widely patent.  There is no
evidence of restenosis.
3.  Circumflex:  This vessel is small in caliber.  It has mild irregularities
throughout its course, but nothing worse than 20%.
4.  Right coronary:  This vessel is large in caliber and dominant.  The proximal
and distal vessels have been stented.  The stent is widely patent.  In the
distal vessel, proximal to the stent, there appears to be at 30%-40% stenosis.
5.  Left ventricle:  Left ventricle is normal in size.  No wall motion
abnormalities are noted.  Estimated ejection fraction is 60%.  There does appear
to be moderate mitral regurgitation noted.
 
IMPRESSION:
1.  Widely patent stents in the right coronary artery and left anterior
descending without evidence of restenosis.
2.  Normal left ventricular function with moderate mitral regurgitation.
 
RECOMMENDATIONS:  I will check an echocardiogram to assess the mitral
regurgitation to see if this accounts for his dyspnea.
 
TRANSINT:[id number] Voice Confirmation ID: [id number] DOCUMENT ID: [id number]
                                           
                                           FAHAD ARREAGA M.D.          
 
 
 
Electronically Signed by FAHAD ARREAGA on 17 at 1059
CC:                                                             [id number]
DICTATION DATE: 17 1406     :     17 1434      DIS IN  
                                                                      17
[facility]                                          
[address] [place] [address]